# Patient Record
Sex: MALE | Race: ASIAN | URBAN - METROPOLITAN AREA
[De-identification: names, ages, dates, MRNs, and addresses within clinical notes are randomized per-mention and may not be internally consistent; named-entity substitution may affect disease eponyms.]

---

## 2017-06-13 ENCOUNTER — MEDICAL CORRESPONDENCE (OUTPATIENT)
Dept: HEALTH INFORMATION MANAGEMENT | Facility: CLINIC | Age: 27
End: 2017-06-13

## 2017-06-13 ENCOUNTER — TRANSFERRED RECORDS (OUTPATIENT)
Dept: HEALTH INFORMATION MANAGEMENT | Facility: CLINIC | Age: 27
End: 2017-06-13

## 2017-06-14 ENCOUNTER — PRE VISIT (OUTPATIENT)
Dept: SURGERY | Facility: CLINIC | Age: 27
End: 2017-06-14

## 2017-06-14 NOTE — TELEPHONE ENCOUNTER
1.  Date/reason for appt: 6/22/17- Internal and external hemorrhoids     2.  Referring provider: THERON PARKINSON NP     3.  Call to patient (Yes / No - short description): Yes, spoke with pt on the phone. Patient stated previous records are in [place]. The only records [facility] the [place] [facility]     4.  Previous care at / records requested from:     1. [facility] - faxed cover sheet

## 2017-06-15 ENCOUNTER — MEDICAL CORRESPONDENCE (OUTPATIENT)
Dept: HEALTH INFORMATION MANAGEMENT | Facility: CLINIC | Age: 27
End: 2017-06-15

## 2017-06-20 NOTE — TELEPHONE ENCOUNTER
Office note 6/13/17 and referral letter from [facility] has been uploaded into Epic.     Closing encounter.

## 2017-06-22 ENCOUNTER — OFFICE VISIT (OUTPATIENT)
Dept: SURGERY | Facility: CLINIC | Age: 27
End: 2017-06-22

## 2017-06-22 VITALS
BODY MASS INDEX: 26.24 KG/M2 | SYSTOLIC BLOOD PRESSURE: 118 MMHG | DIASTOLIC BLOOD PRESSURE: 73 MMHG | WEIGHT: 183.3 LBS | HEIGHT: 70 IN | OXYGEN SATURATION: 100 % | HEART RATE: 65 BPM

## 2017-06-22 DIAGNOSIS — K62.5 RECTAL BLEEDING: ICD-10-CM

## 2017-06-22 DIAGNOSIS — K62.89 ANAL PAIN: ICD-10-CM

## 2017-06-22 DIAGNOSIS — K59.09 OTHER CONSTIPATION: ICD-10-CM

## 2017-06-22 DIAGNOSIS — K60.2 ANAL FISSURE: Primary | ICD-10-CM

## 2017-06-22 ASSESSMENT — ENCOUNTER SYMPTOMS
JAUNDICE: 0
NAIL CHANGES: 0
BOWEL INCONTINENCE: 0
RECTAL BLEEDING: 1
BLOATING: 0
RECTAL PAIN: 0
SKIN CHANGES: 0
POOR WOUND HEALING: 0
VOMITING: 0
BLOOD IN STOOL: 0
ABDOMINAL PAIN: 0
DIARRHEA: 0
CONSTIPATION: 1
HEARTBURN: 0
NAUSEA: 0

## 2017-06-22 ASSESSMENT — PAIN SCALES - GENERAL: PAINLEVEL: NO PAIN (0)

## 2017-06-22 NOTE — PROGRESS NOTES
Colon and Rectal Surgery Consult Clinic Note    Date: 2017     Referring provider:  Rupinder Snow NP  [address] [address]     RE: Carlee Trejo  : 1990  ARON: 2017    Carlee Trejo is a very pleasant 26 year old male without a significant past medical history with a recent diagnosis of rectal pain.  Given these findings they were subsequently sent to the Colon and Rectal Surgery Clinic for an opinion on this and a new patient consultation.     VJ reports that he has had intermittent rectal bleeding on and off for the past 8 years. He was previously seen in [place] for this and was diagnosed with hemorrhoids. He reports that both his father and grandfather had surgery for hemorrhoids. He initially is to have severe pain and bleeding with his hemorrhoids. He now has more bleeding with only intermittent mild pain. He additionally has some itching. He is using topical hydrocortisone cream which has improved his symptoms some. He has a bowel movement about once a day and this is occasionally hard. He does report sitting on the toilet for long periods of time in order to have a bowel movement. He takes MiraLAX once a day and this has made his stools much softer. He denies any family history of any colon cancer. He has never had a colonoscopy. He is not on any blood thinners.    Assessment/Plan: 26 year old male without a significant past medical history with prolapsing internal hemorrhoids and anal fissure. On exam he has fairly large prolapsing internal hemorrhoids without any active bleeding. There are some skin tags in the posterior midline and a palpable ulceration with pain at that site in the posterior midline. This seems to represent an anal fissure, although was not easily visualized given the extent of his hemorrhoids. Discussed trying topical therapy for healing of his fissure as this may be the source of his intermittent pain  "and a dressing hemorrhoids once this is healed. Would like him to use topical diltiazem 3 times a day for the next 8 weeks. If no significant improvement after 4 weeks, return to clinic. If symptoms are not improving, would consider examination under anesthesia to further evaluate for ulceration versus anal fissure and given the extent of his hemorrhoids these may make it difficult for his fissure to heal properly. If symptoms improve but are not completely resolved, return to clinic in 8 weeks and can reassess at that time as well. Recommended he start on a daily fiber supplement and continue on daily MiraLAX to keep his bowel movements soft.  Patient's questions were answered to his stated satisfaction and he is in agreement with this plan.    Medical history:  History reviewed. No pertinent past medical history.    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  Use a pea-sized amount.  Store at room temperature. 60 g 0     NO ACTIVE MEDICATIONS          Allergies:  No Known Allergies    Family history:  History reviewed. No pertinent family history.    Social history:  Social History   Substance Use Topics     Smoking status: Never Smoker     Smokeless tobacco: Never Used     Alcohol use Yes    Marital status: unknown.  Occupation: PhD student in electrical engineering    There are no exam notes on file for this visit.     Physical Examination:  /73  Pulse 65  Ht 5' 10.47\"  Wt 183 lb 4.8 oz  SpO2 100%  BMI 25.95 kg/m2  General: alert, oriented, in no acute distress, " sitting comfortably   HEENT: mucous membranes moist   Perianal external examination:  Perianal skin: Intact with no excoriation or lichenification.  Lesions: No evidence of an external lesion, nodularity, or induration in the perianal region.  Eversion of buttocks: There was evidence of an anal fissure. Details: posterior midline anal fissure with associated sentinel skin tags.  Skin tags or external hemorrhoids: external hemorrhoids with prolapsing internal hemorrhoidal tissue without bleeding   Digital rectal examination: Was performed.   Sphincter tone: Good.  Palpable lesions: Yes - Location: palpable ulceration in the posterior midline at the anal verge with palpable internal hemorrhoids circumferentially.  Prostate: Normal.  Other: None..    Anoscopy: Was performed.   Hemorrhoids: Yes. Grade 3 internal hemorrhoids without active bleeding  Lesions: Yes: small ulceration in the posterior midline at the anal verge with a few associated skin tags with a small amount of active bleeding.    Total face to face time was 30 minutes, >50% counseling.    SPENCER Duran, NP-C  Colon and Rectal Surgery   [facility]    This note was created using speech recognition software and may contain unintended word substitutions.

## 2017-06-22 NOTE — Clinical Note
2017       RE: Carlee Trejo  [address]  [place] [address]     Dear Colleague,    Thank you for referring your patient, Carlee Trejo, to the [facility] COLON AND RECTAL SURGERY at [facility]. Please see a copy of my visit note below.    Colon and Rectal Surgery Consult Clinic Note    Date: 2017     Referring provider:  Rupinder Snow NP  [facility]  [address] [place] [address]     RE: Carlee Trejo  : 1990  ARON: 2017    Carlee Trejo is a very pleasant 26 year old male without a significant past medical history with a recent diagnosis of rectal pain.  Given these findings they were subsequently sent to the Colon and Rectal Surgery Clinic for an opinion on this and a new patient consultation.     VJ reports that he has had intermittent rectal bleeding on and off for the past 8 years. He was previously seen in [place] for this and was diagnosed with hemorrhoids. He reports that both his father and grandfather had surgery for hemorrhoids. He initially is to have severe pain and bleeding with his hemorrhoids. He now has more bleeding with only intermittent mild pain. He additionally has some itching. He is using topical hydrocortisone cream which has improved his symptoms some. He has a bowel movement about once a day and this is occasionally hard. He does report sitting on the toilet for long periods of time in order to have a bowel movement. He takes MiraLAX once a day and this has made his stools much softer. He denies any family history of any colon cancer. He has never had a colonoscopy. He is not on any blood thinners.    Assessment/Plan: 26 year old male without a significant past medical history with prolapsing internal hemorrhoids and anal fissure. On exam he has fairly large prolapsing internal hemorrhoids without any active bleeding. There are some skin tags in the posterior  midline and a palpable ulceration with pain at that site in the posterior midline. This seems to represent an anal fissure, although was not easily visualized given the extent of his hemorrhoids. Discussed trying topical therapy for healing of his fissure as this may be the source of his intermittent pain and a dressing hemorrhoids once this is healed. Would like him to use topical diltiazem 3 times a day for the next 8 weeks. If no significant improvement after 4 weeks, return to clinic. If symptoms are not improving, would consider examination under anesthesia to further evaluate for ulceration versus anal fissure and given the extent of his hemorrhoids these may make it difficult for his fissure to heal properly. If symptoms improve but are not completely resolved, return to clinic in 8 weeks and can reassess at that time as well. Recommended he start on a daily fiber supplement and continue on daily MiraLAX to keep his bowel movements soft.  Patient's questions were answered to his stated satisfaction and he is in agreement with this plan.    Medical history:  History reviewed. No pertinent past medical history.    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  Use a pea-sized amount.  Store at room temperature. 60 g 0     NO ACTIVE MEDICATIONS          Allergies:  No Known Allergies    Family history:  History reviewed. No pertinent family history.    Social history:  Social History   Substance Use Topics     Smoking status: Never Smoker     Smokeless tobacco: Never Used      "Alcohol use Yes    Marital status: unknown.  Occupation: PhD student in electrical engineering    There are no exam notes on file for this visit.     Physical Examination:  /73  Pulse 65  Ht 5' 10.47\"  Wt 183 lb 4.8 oz  SpO2 100%  BMI 25.95 kg/m2  General: alert, oriented, in no acute distress, sitting comfortably   HEENT: mucous membranes moist   Perianal external examination:  Perianal skin: Intact with no excoriation or lichenification.  Lesions: No evidence of an external lesion, nodularity, or induration in the perianal region.  Eversion of buttocks: There was evidence of an anal fissure. Details: posterior midline anal fissure with associated sentinel skin tags.  Skin tags or external hemorrhoids: external hemorrhoids with prolapsing internal hemorrhoidal tissue without bleeding   Digital rectal examination: Was performed.   Sphincter tone: Good.  Palpable lesions: Yes - Location: palpable ulceration in the posterior midline at the anal verge with palpable internal hemorrhoids circumferentially.  Prostate: Normal.  Other: None..    Anoscopy: Was performed.   Hemorrhoids: Yes. Grade 3 internal hemorrhoids without active bleeding  Lesions: Yes: small ulceration in the posterior midline at the anal verge with a few associated skin tags with a small amount of active bleeding.    Total face to face time was 30 minutes, >50% counseling.    SPENCER Duran, NP-C  Colon and Rectal Surgery   [facility]    This note was created using speech recognition software and may contain unintended word substitutions.      Again, thank you for allowing me to participate in the care of your patient.      Sincerely,    SPENCER Duran CNP    "

## 2017-06-22 NOTE — LETTER
2017      RE: Carlee Trejo  [address]  [place] [address]       Colon and Rectal Surgery Consult Clinic Note    Date: 2017     Referring provider:  Rupinder Snow NP  [facility]  [address] [place] [address]     RE: Carlee Trejo  : 1990  ARON: 2017    Carlee Trejo is a very pleasant 26 year old male without a significant past medical history with a recent diagnosis of rectal pain.  Given these findings they were subsequently sent to the Colon and Rectal Surgery Clinic for an opinion on this and a new patient consultation.     VJ reports that he has had intermittent rectal bleeding on and off for the past 8 years. He was previously seen in [place] for this and was diagnosed with hemorrhoids. He reports that both his father and grandfather had surgery for hemorrhoids. He initially is to have severe pain and bleeding with his hemorrhoids. He now has more bleeding with only intermittent mild pain. He additionally has some itching. He is using topical hydrocortisone cream which has improved his symptoms some. He has a bowel movement about once a day and this is occasionally hard. He does report sitting on the toilet for long periods of time in order to have a bowel movement. He takes MiraLAX once a day and this has made his stools much softer. He denies any family history of any colon cancer. He has never had a colonoscopy. He is not on any blood thinners.    Assessment/Plan: 26 year old male without a significant past medical history with prolapsing internal hemorrhoids and anal fissure. On exam he has fairly large prolapsing internal hemorrhoids without any active bleeding. There are some skin tags in the posterior midline and a palpable ulceration with pain at that site in the posterior midline. This seems to represent an anal fissure, although was not easily visualized given the extent of his hemorrhoids. Discussed trying  "topical therapy for healing of his fissure as this may be the source of his intermittent pain and a dressing hemorrhoids once this is healed. Would like him to use topical diltiazem 3 times a day for the next 8 weeks. If no significant improvement after 4 weeks, return to clinic. If symptoms are not improving, would consider examination under anesthesia to further evaluate for ulceration versus anal fissure and given the extent of his hemorrhoids these may make it difficult for his fissure to heal properly. If symptoms improve but are not completely resolved, return to clinic in 8 weeks and can reassess at that time as well. Recommended he start on a daily fiber supplement and continue on daily MiraLAX to keep his bowel movements soft.  Patient's questions were answered to his stated satisfaction and he is in agreement with this plan.    Medical history:  History reviewed. No pertinent past medical history.    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  Use a pea-sized amount.  Store at room temperature. 60 g 0     NO ACTIVE MEDICATIONS          Allergies:  No Known Allergies    Family history:  History reviewed. No pertinent family history.    Social history:  Social History   Substance Use Topics     Smoking status: Never Smoker     Smokeless tobacco: Never Used     Alcohol use Yes    Marital status: unknown.  Occupation: PhD student in electrical engineering    There are no exam notes on file for this visit.     Physical Examination:  /73  Pulse 65  Ht 5' 10.47\"  Wt 183 " lb 4.8 oz  SpO2 100%  BMI 25.95 kg/m2  General: alert, oriented, in no acute distress, sitting comfortably   HEENT: mucous membranes moist   Perianal external examination:  Perianal skin: Intact with no excoriation or lichenification.  Lesions: No evidence of an external lesion, nodularity, or induration in the perianal region.  Eversion of buttocks: There was evidence of an anal fissure. Details: posterior midline anal fissure with associated sentinel skin tags.  Skin tags or external hemorrhoids: external hemorrhoids with prolapsing internal hemorrhoidal tissue without bleeding   Digital rectal examination: Was performed.   Sphincter tone: Good.  Palpable lesions: Yes - Location: palpable ulceration in the posterior midline at the anal verge with palpable internal hemorrhoids circumferentially.  Prostate: Normal.  Other: None..    Anoscopy: Was performed.   Hemorrhoids: Yes. Grade 3 internal hemorrhoids without active bleeding  Lesions: Yes: small ulceration in the posterior midline at the anal verge with a few associated skin tags with a small amount of active bleeding.    Total face to face time was 30 minutes, >50% counseling.    SPENCER Duran, NP-C  Colon and Rectal Surgery   [facility]    This note was created using speech recognition software and may contain unintended word substitutions.      SPENCER Duran CNP

## 2017-06-22 NOTE — PATIENT INSTRUCTIONS
1. Diltiazem ointment 2% to be applied 3 times daily for 8 weeks  2. Fiber supplement such as Metamcuil, Citrucel, or Benefiber once to twice a day  3. MiraLax or Milk of Magnesia if still constipated  4. Drink 10 glasses of water a day  5. Tylenol, ibuprofen, and warm tub baths/sitz baths for pain  6. Follow up in 8 weeks. Follow up sooner if symptoms do not improve after 4 weeks. If symptoms persist, consider examination under anesthesia

## 2017-08-17 ENCOUNTER — OFFICE VISIT (OUTPATIENT)
Dept: SURGERY | Facility: CLINIC | Age: 27
End: 2017-08-17

## 2017-08-17 VITALS
SYSTOLIC BLOOD PRESSURE: 119 MMHG | HEART RATE: 76 BPM | WEIGHT: 183.1 LBS | HEIGHT: 70 IN | OXYGEN SATURATION: 98 % | TEMPERATURE: 98.6 F | DIASTOLIC BLOOD PRESSURE: 70 MMHG | BODY MASS INDEX: 26.21 KG/M2

## 2017-08-17 DIAGNOSIS — K62.5 RECTAL BLEEDING: Primary | ICD-10-CM

## 2017-08-17 DIAGNOSIS — K64.8 INTERNAL HEMORRHOIDS: ICD-10-CM

## 2017-08-17 ASSESSMENT — PAIN SCALES - GENERAL: PAINLEVEL: MILD PAIN (2)

## 2017-08-17 NOTE — LETTER
2017    Dr. Rojas,    I have this patient set up to see you in clinic on 17 and have tentatively held a surgery spot on 17 with you. He will have a preop physical next week as well. Please let me know if you have any questions or concerns.    SPENCER Duran, NP-C  Colon and Rectal Surgery  [facility]    RE: Carlee Trejo  [address]  [facility] [place] [facility] [address]       Colon and Rectal Surgery Consult Clinic Note    Referring provider:  Rupinder Snow NP  [facility]  [address] [place] [address]     RE: Carlee Trejo  : 1990  ARON: 17    Carlee Trejo is a very pleasant 26 year old male without a significant past medical history who was seen in clinic 2 months ago with rectal pain and bleeding. He presents today for follow up.    SHYANNE has had very little rectal bleeding and pain has almost completley resolved. He only notices a small drop of blood in the toilet every 4-5 days and this is usually if he misses a dose of fiber. He gets some irritation after bowel movements but no significant pain. He continues to have prolapsing hemorrhoids. He has tissue that comes out after bowel movements and only goes back in with manually reducing or after biking. His bowel movements have been soft with daily fiber. He continues to use diltiazem twice a day.    Assessment/Plan: 26 year old male without a significant past medical history with prolapsing internal hemorrhoids and anal ulcer. On exam he has fairly large prolapsing internal hemorrhoids without any active bleeding. There are some skin tags in the posterior midline and a palpable ulceration that is no longer painful. This seems to represent an anal fissure, although was not easily visualized given the extent of his hemorrhoids. Pain has resolved and bleeding has significantly improved but he has had intermittent bleeding for the past 8 years. We  discussed management of internal hemorrhoids including hemorrhoid banding versus surgical hemorrhoidectomy. Given the extent of his hemorrhoids, I think he may be better served with surgical hemorrhoidectomy and think he would benefit from an EUA to visualize the palpable ulcer as well. General risks related to anorectal surgery were reviewed with the patient, including but not limited to infection, bleeding, anal stenosis, fistula, fissure, fecal incontinence, and urinary retention. We discussed that surgical hemorrhoidectomy often results in quite a bit of pain postoperatively. He states an understanding of these risks and would like to discuss surgical hemorrhoidectomy rather than hemorrhoid banding, and I think this is reasonable. He will be starting school again at the beginning of September but states that he can arrange so he does not need to be in class for the first few weeks if needed. Will set him up to meet with one of our surgeons to discuss further.  Patient's questions were answered to his stated satisfaction and he is in agreement with this plan.    Medical history:  No past medical history on file.    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  Use a pea-sized amount.  Store at room temperature. 60 g 0       Allergies:  No Known Allergies    Family history:  No family history on file.    Social history:  Social History   Substance Use Topics     Smoking status: Never Smoker     Smokeless tobacco: Never Used     Alcohol use Yes    Marital status: unknown.  Occupation:  "PhD student in electrical engineering    Nursing Notes:   Gopal Olivares LPN  8/17/2017  8:06 AM  Signed  Chief Complaint   Patient presents with     Clinic Care Coordination - Initial     New patient, internal and external hemorrhoid.        Vitals:    08/17/17 0804   BP: 119/70   BP Location: Left arm   Patient Position: Chair   Cuff Size: Adult Regular   Pulse: 76   Temp: 98.6  F (37  C)   TempSrc: Oral   SpO2: 98%   Weight: 183 lb 1.6 oz   Height: 5' 10.47\"       Body mass index is 25.92 kg/(m^2).    Sourav MORTON LPN                           Physical Examination:  /70 (BP Location: Left arm, Patient Position: Chair, Cuff Size: Adult Regular)  Pulse 76  Temp 98.6  F (37  C) (Oral)  Ht 5' 10.47\"  Wt 183 lb 1.6 oz  SpO2 98%  BMI 25.92 kg/m2  General: alert, oriented, in no acute distress, sitting comfortably   HEENT: mucous membranes moist   Perianal external examination:  Perianal skin: Intact with no excoriation or lichenification.  Lesions: No evidence of an external lesion, nodularity, or induration in the perianal region.  Eversion of buttocks: There was evidence of an anal fissure. Details: posterior midline anal fissure with associated sentinel skin tags.  Skin tags or external hemorrhoids: external hemorrhoids with prolapsing internal hemorrhoidal tissue without bleeding   Digital rectal examination: Was performed.   Sphincter tone: Good.  Palpable lesions: Yes - Location: palpable ulceration in the posterior midline just inside the anal verge with palpable internal hemorrhoids circumferentially.  Prostate: Normal.  Other: None..    Anoscopy: Was performed.   Hemorrhoids: Yes. Grade 3 internal hemorrhoids without active bleeding  Lesions: Yes: small ulceration in the posterior midline at the anal verge with a few associated skin tags with a small amount of active bleeding.    Total face to face time was 20 minutes, >50% counseling.    SPENCER Duran, NP-C  Colon and Rectal Surgery "   [facility]    This note was created using speech recognition software and may contain unintended word substitutions.    SPENCER Dolan CNP

## 2017-08-17 NOTE — MR AVS SNAPSHOT
After Visit Summary   8/17/2017    Carlee Trejo    MRN: [id number]           Patient Information     Date Of Birth          1990        Visit Information        Provider Department      8/17/2017 8:30 AM Ashley Ley APRN CNP [facility] Colon and Rectal Surgery         Follow-ups after your visit        Your next 10 appointments already scheduled     Aug 21, 2017  9:00 AM CDT   (Arrive by 8:45 AM)   PAC EVALUATION with Uc Pac Krista 1   [facility] Preoperative Assessment [place] ([facility])    [address] [place] [id number] [phone]            Aug 21, 2017 10:00 AM CDT   (Arrive by 9:45 AM)   PAC RN ASSESSMENT with Uc Pac Rn   [facility] Assessment [place] ([facility])    [address] [place] [id number] [phone]            Aug 21, 2017 10:20 AM CDT   (Arrive by 10:05 AM)   PAC Anesthesia Consult with Uc Pac Anesthesiologist   [facility] Assessment [place] ([facility])    [address] [place] [id number] [phone]            Aug 28, 2017  8:00 AM CDT   (Arrive by 7:45 AM)   New Patient Visit with Sage Rojas MD   [facility] Colon and Rectal Surgery ([facility])    [address] [place] [id number] [phone]              Who to contact     Please call your clinic at [phone] to:    Ask questions about your health    Make or cancel appointments    Discuss your medicines    Learn about your test results    Speak to your doctor   If you have compliments or concerns about an experience at your clinic, or if you wish to file a complaint, please contact [facility] Patient Relations at [phone] or email us at [email or web address]         Additional Information About Your Visit       "  MyChart Information     AngioSlide is an electronic gateway that provides easy, online access to your medical records. With AngioSlide, you can request a clinic appointment, read your test results, renew a prescription or communicate with your care team.     To sign up for AngioSlide visit the website at [email or web address]   You will be asked to enter the access code listed below, as well as some personal information. Please follow the directions to create your username and password.     Your access code is: [email or web address]  Expires: 2017  6:30 AM     Your access code will  in 90 days. If you need help or a new code, please contact your [facility] or call [phone] for assistance.        Care EveryWhere ID     This is your Care EveryWhere ID. This could be used by other organizations to access your [facility] medical records  [id number]        Your Vitals Were     Pulse Temperature Height Pulse Oximetry BMI (Body Mass Index)       76 98.6  F (37  C) (Oral) 5' 10.47\" 98% 25.92 kg/m2        Blood Pressure from Last 3 Encounters:   17 119/70   17 118/73    Weight from Last 3 Encounters:   17 183 lb 1.6 oz   17 183 lb 4.8 oz              Today, you had the following     No orders found for display       Primary Care Provider    No Ref-Primary Verified       No address on file        Equal Access to Services     GARRY CLINE : Hadii aad ku hadasho Sojoseloali, waaxda luqadaha, qaybta kaalmada adeegyada, itz keita hayruth marinoarakerline bundy . So [facility] [phone].    ATENCIÓN: Si habla español, tiene a adam disposición servicios gratuitos de asistencia lingüística. Llame al [phone].    We comply with applicable federal civil rights laws and [place] laws. We do not discriminate on the basis of race, color, national origin, age, disability sex, sexual orientation or gender identity.            Thank you!     Thank you for choosing [facility]  for your care. Our goal is always to provide you with excellent care. Hearing back from our patients is one way we can continue to improve our services. Please take a few minutes to complete the written survey that you may receive in the mail after your visit with us. Thank you!             Your Updated Medication List - Protect others around you: Learn how to safely use, store and throw away your medicines at www.disposemymeds.org.          This list is accurate as of: 8/17/17  8:33 AM.  Always use your most recent med list.                   Brand Name Dispense Instructions for use Diagnosis    diltiazem 2% in PLO cream (FV COMPOUNDED) 2% Gel     60 g    To anal opening three times daily.  Use a pea-sized amount.  Store at room temperature.    Anal fissure

## 2017-08-17 NOTE — PROGRESS NOTES
Colon and Rectal Surgery Consult Clinic Note    Referring provider:  Rupinder Snow NP  [address] [address]     RE: Carlee Trejo  : 1990  ARON: 17    Carlee Trejo is a very pleasant 26 year old male without a significant past medical history who was seen in clinic 2 months ago with rectal pain and bleeding. He presents today for follow up.    VJ has had very little rectal bleeding and pain has almost completley resolved. He only notices a small drop of blood in the toilet every 4-5 days and this is usually if he misses a dose of fiber. He gets some irritation after bowel movements but no significant pain. He continues to have prolapsing hemorrhoids. He has tissue that comes out after bowel movements and only goes back in with manually reducing or after biking. His bowel movements have been soft with daily fiber. He continues to use diltiazem twice a day.    Assessment/Plan: 26 year old male without a significant past medical history with prolapsing internal hemorrhoids and anal ulcer. On exam he has fairly large prolapsing internal hemorrhoids without any active bleeding. There are some skin tags in the posterior midline and a palpable ulceration that is no longer painful. This seems to represent an anal fissure, although was not easily visualized given the extent of his hemorrhoids. Pain has resolved and bleeding has significantly improved but he has had intermittent bleeding for the past 8 years. We discussed management of internal hemorrhoids including hemorrhoid banding versus surgical hemorrhoidectomy. Given the extent of his hemorrhoids, I think he may be better served with surgical hemorrhoidectomy and think he would benefit from an EUA to visualize the palpable ulcer as well. General risks related to anorectal surgery were reviewed with the patient, including but not limited to infection, bleeding, anal stenosis, fistula,  fissure, fecal incontinence, and urinary retention. We discussed that surgical hemorrhoidectomy often results in quite a bit of pain postoperatively. He states an understanding of these risks and would like to discuss surgical hemorrhoidectomy rather than hemorrhoid banding, and I think this is reasonable. He will be starting school again at the beginning of September but states that he can arrange so he does not need to be in class for the first few weeks if needed. Will set him up to meet with one of our surgeons to discuss further.  Patient's questions were answered to his stated satisfaction and he is in agreement with this plan.    Medical history:  No past medical history on file.    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  Use a pea-sized amount.  Store at room temperature. 60 g 0       Allergies:  No Known Allergies    Family history:  No family history on file.    Social history:  Social History   Substance Use Topics     Smoking status: Never Smoker     Smokeless tobacco: Never Used     Alcohol use Yes    Marital status: unknown.  Occupation: PhD student in electrical engineering    Nursing Notes:   Gopal Olivares LPN  8/17/2017  8:06 AM  Signed  Chief Complaint   Patient presents with     Clinic Care Coordination - Initial     New patient, internal and external hemorrhoid.        Vitals:    08/17/17 0804   BP: 119/70   BP Location: Left arm   Patient Position: Chair   Cuff Size: Adult Regular   Pulse: 76   Temp: 98.6  F (37  C)   TempSrc: Oral   SpO2: 98%   Weight: 183  "lb 1.6 oz   Height: 5' 10.47\"       Body mass index is 25.92 kg/(m^2).    Zong X, LPN                           Physical Examination:  /70 (BP Location: Left arm, Patient Position: Chair, Cuff Size: Adult Regular)  Pulse 76  Temp 98.6  F (37  C) (Oral)  Ht 5' 10.47\"  Wt 183 lb 1.6 oz  SpO2 98%  BMI 25.92 kg/m2  General: alert, oriented, in no acute distress, sitting comfortably   HEENT: mucous membranes moist   Perianal external examination:  Perianal skin: Intact with no excoriation or lichenification.  Lesions: No evidence of an external lesion, nodularity, or induration in the perianal region.  Eversion of buttocks: There was evidence of an anal fissure. Details: posterior midline anal fissure with associated sentinel skin tags.  Skin tags or external hemorrhoids: external hemorrhoids with prolapsing internal hemorrhoidal tissue without bleeding   Digital rectal examination: Was performed.   Sphincter tone: Good.  Palpable lesions: Yes - Location: palpable ulceration in the posterior midline just inside the anal verge with palpable internal hemorrhoids circumferentially.  Prostate: Normal.  Other: None..    Anoscopy: Was performed.   Hemorrhoids: Yes. Grade 3 internal hemorrhoids without active bleeding  Lesions: Yes: small ulceration in the posterior midline at the anal verge with a few associated skin tags with a small amount of active bleeding.    Total face to face time was 20 minutes, >50% counseling.    SPENCER Duran, NP-C  Colon and Rectal Surgery   [facility]    This note was created using speech recognition software and may contain unintended word substitutions.  "

## 2017-08-17 NOTE — NURSING NOTE
"Chief Complaint   Patient presents with     Clinic Care Coordination - Initial     New patient, internal and external hemorrhoid.        Vitals:    08/17/17 0804   BP: 119/70   BP Location: Left arm   Patient Position: Chair   Cuff Size: Adult Regular   Pulse: 76   Temp: 98.6  F (37  C)   TempSrc: Oral   SpO2: 98%   Weight: 183 lb 1.6 oz   Height: 5' 10.47\"       Body mass index is 25.92 kg/(m^2).    Sourav MORTON LPN                        " Walk in

## 2017-08-21 ENCOUNTER — ANESTHESIA EVENT (OUTPATIENT)
Dept: SURGERY | Facility: CLINIC | Age: 27
End: 2017-08-21

## 2017-08-21 ENCOUNTER — OFFICE VISIT (OUTPATIENT)
Dept: SURGERY | Facility: CLINIC | Age: 27
End: 2017-08-21

## 2017-08-21 ENCOUNTER — PRE VISIT (OUTPATIENT)
Dept: SURGERY | Facility: CLINIC | Age: 27
End: 2017-08-21

## 2017-08-21 ENCOUNTER — ALLIED HEALTH/NURSE VISIT (OUTPATIENT)
Dept: SURGERY | Facility: CLINIC | Age: 27
End: 2017-08-21

## 2017-08-21 ENCOUNTER — TELEPHONE (OUTPATIENT)
Dept: SURGERY | Facility: CLINIC | Age: 27
End: 2017-08-21

## 2017-08-21 VITALS
HEIGHT: 71 IN | TEMPERATURE: 98.3 F | WEIGHT: 185.5 LBS | SYSTOLIC BLOOD PRESSURE: 122 MMHG | OXYGEN SATURATION: 100 % | BODY MASS INDEX: 25.97 KG/M2 | HEART RATE: 70 BPM | RESPIRATION RATE: 14 BRPM | DIASTOLIC BLOOD PRESSURE: 85 MMHG

## 2017-08-21 DIAGNOSIS — Z01.818 PREOP EXAMINATION: Primary | ICD-10-CM

## 2017-08-21 NOTE — PATIENT INSTRUCTIONS
Preparing for Your Surgery      Name:  Carlee Trejo   MRN:  [id number]   :  1990   Today's Date:  2017          You will receive a phone call 1 or 2 days from the PAC nurse once you are scheduled for surgery to go over check in time, location of surgery.       Feel free to call the PAC anytime once you know your OR date, number .    Arriving for surgery:  Surgery date:  TBD  Arrival time:  TBD  Please come to:     [facility]  [address] [place] [phone]    [employer] Parking is available in front of the Clinics and Surgery Center building from 5:30AM to 8:00PM.  -  Proceed to the 5th floor to check into the Ambulatory Surgery Center.              >> There will be patient concierges on the 1st and 5th floor, for assistance or an escort, if you would like.              >> Please call [phone] with any questions.    What can I eat or drink?  -  You may have solid food or milk products until 8 hours prior to your surgery.  -  You may have water, apple juice or 7up/Sprite until 2 hours prior to your surgery.    Which medicines can I take?         Stop aspirin products 7 days before surgery, stop advil/ibuprofen 1 - 2 days before surgery.       Tylenol is okay to take anytime before surgery.  -  Do NOT take these medications in the morning, the day of surgery:  No creams    -  Please take these medications the day of surgery:  NONE    How do I prepare myself?  -  Take two showers: one the night before surgery; and one the morning of surgery.         Use Scrubcare or Hibiclens to wash from neck down.  You may use your own shampoo and conditioner. No other hair products.   -  Do NOT use lotion, powder, deodorant, or antiperspirant the day of your surgery.  -  Do NOT wear any makeup, fingernail polish or jewelry.  -Do not bring your own medications to the hospital, except for inhalers and eye drops.  -  Bring your ID and insurance card.    Questions  or Concerns:  If you have questions or concerns, please call the  Preoperative Assessment Center, Monday-Friday 7AM-7PM:  [phone]          AFTER YOUR SURGERY  Breathing exercises   Breathing exercises help you recover faster. Take deep breaths and let the air out slowly. This will:     Help you wake up after surgery.    Help prevent complications like pneumonia.  Preventing complications will help you go home sooner.   We may give you a breathing device (incentive spirometer) to encourage you to breathe deeply.   Nausea and vomiting   You may feel sick to your stomach after surgery; if so, let your nurse know.    Pain control:  After surgery, you may have pain. Our goal is to help you manage your pain. Pain medicine will help you feel comfortable enough to do activities that will help you heal.  These activities may include breathing exercises, walking and physical therapy.   To help your health care team treat your pain we will ask: 1) If you have pain  2) where it is located 3) describe your pain in your words  Methods of pain control include medications given by mouth, vein or by nerve block for some surgeries.  We may give you a pain control pump that will:  1) Deliver the medicine through a tube placed in your vein  2) Control the amount of medicine you receive  3) Allow you to push a button to deliver a dose of pain medicine  Sequential Compression Device (SCD) or Pneumo Boots:  You may need to wear SCD S on your legs or feet. These are wraps connected to a machine that pumps in air and releases it. The repeated pumping helps prevent blood clots from forming.

## 2017-08-21 NOTE — MR AVS SNAPSHOT
After Visit Summary   2017    Carlee Trejo    MRN: [id number]           Patient Information     Date Of Birth          1990        Visit Information        Provider Department      2017 10:00 AM Rn, [facility] Preoperative Assessment Center        Care Instructions    Preparing for Your Surgery      Name:  Carlee Trejo   MRN:  [id number]   :  1990   Today's Date:  2017          You will receive a phone call 1 or 2 days from the PAC nurse once you are scheduled for surgery to go over check in time, location of surgery.       Feel free to call the PAC anytime once you know your OR date, number .    Arriving for surgery:  Surgery date:  TBD  Arrival time:  TBD  Please come to:     [facility]  [address] [place] [phone]     Parking is available in front of the Clinics and Surgery Center building from 5:30AM to 8:00PM.  -  Proceed to the 5th floor to check into the Ambulatory Surgery Center.              >> There will be patient concierges on the 1st and 5th floor, for assistance or an escort, if you would like.              >> Please call [phone] with any questions.    What can I eat or drink?  -  You may have solid food or milk products until 8 hours prior to your surgery.  -  You may have water, apple juice or 7up/Sprite until 2 hours prior to your surgery.    Which medicines can I take?         Stop aspirin products 7 days before surgery, stop advil/ibuprofen 1 - 2 days before surgery.       Tylenol is okay to take anytime before surgery.  -  Do NOT take these medications in the morning, the day of surgery:  No creams    -  Please take these medications the day of surgery:  NONE    How do I prepare myself?  -  Take two showers: one the night before surgery; and one the morning of surgery.         Use Scrubcare or Hibiclens to wash from neck down.  You may use your own shampoo and  conditioner. No other hair products.   -  Do NOT use lotion, powder, deodorant, or antiperspirant the day of your surgery.  -  Do NOT wear any makeup, fingernail polish or jewelry.  -Do not bring your own medications to the hospital, except for inhalers and eye drops.  -  Bring your ID and insurance card.    Questions or Concerns:  If you have questions or concerns, please call the  Preoperative Assessment Center, Monday-Friday 7AM-7PM:  [phone]          AFTER YOUR SURGERY  Breathing exercises   Breathing exercises help you recover faster. Take deep breaths and let the air out slowly. This will:     Help you wake up after surgery.    Help prevent complications like pneumonia.  Preventing complications will help you go home sooner.   We may give you a breathing device (incentive spirometer) to encourage you to breathe deeply.   Nausea and vomiting   You may feel sick to your stomach after surgery; if so, let your nurse know.    Pain control:  After surgery, you may have pain. Our goal is to help you manage your pain. Pain medicine will help you feel comfortable enough to do activities that will help you heal.  These activities may include breathing exercises, walking and physical therapy.   To help your health care team treat your pain we will ask: 1) If you have pain  2) where it is located 3) describe your pain in your words  Methods of pain control include medications given by mouth, vein or by nerve block for some surgeries.  We may give you a pain control pump that will:  1) Deliver the medicine through a tube placed in your vein  2) Control the amount of medicine you receive  3) Allow you to push a button to deliver a dose of pain medicine  Sequential Compression Device (SCD) or Pneumo Boots:  You may need to wear SCD S on your legs or feet. These are wraps connected to a machine that pumps in air and releases it. The repeated pumping helps prevent blood clots from forming.           Follow-ups after your  visit        Your next 10 appointments already scheduled     Aug 21, 2017 10:00 AM CDT   (Arrive by 9:45 AM)   PAC RN ASSESSMENT with  Pac Rn   [facility] Preoperative Assessment Center ([facility])    [address] [place] [id number] [phone]            Aug 21, 2017 10:20 AM CDT   (Arrive by 10:05 AM)   PAC Anesthesia Consult with  Pac Anesthesiologist   [facility] Preoperative Assessment Center ([facility])    [address] [place] [id number] [phone]            Aug 21, 2017 11:00 AM CDT   LAB with  LAB   [facility] Lab ([facility])    [address] [place] [phone]           Patient must bring picture ID. Patient should be prepared to give a urine specimen  Please do not eat 10-12 hours before your appointment if you are coming in fasting for labs on lipids, cholesterol, or glucose (sugar). Pregnant women should follow their Care Team instructions. Water with medications is okay. Do not drink coffee or other fluids. If you have concerns about taking  your medications, please ask at office or if scheduling via [employer], send a message by clicking on Secure Messaging, Message Your Care Team.            Aug 28, 2017  3:00 PM CDT   (Arrive by 2:45 PM)   New Patient Visit with Sage Rojas MD   [facility] Colon and Rectal Surgery ([facility])    [address] [place] [id number] [phone]              Who to contact     Please call your clinic at [phone] to:    Ask questions about your health    Make or cancel appointments    Discuss your medicines    Learn about your test results    Speak to your doctor   If you have compliments or concerns about an experience at your clinic, or if you wish to file a complaint, please contact [facility] Patient  Relations at [phone] or email us at [email or web address]         Additional Information About Your Visit        Druva Information     Druva is an electronic gateway that provides easy, online access to your medical records. With Druva, you can request a clinic appointment, read your test results, renew a prescription or communicate with your care team.     To sign up for Druva visit the website at [email or web address]   You will be asked to enter the access code listed below, as well as some personal information. Please follow the directions to create your username and password.     Your access code is: [email or web address]  Expires: 2017  6:30 AM     Your access code will  in 90 days. If you need help or a new code, please contact your [facility] or call [phone] for assistance.        Care EveryWhere ID     This is your Care EveryWhere ID. This could be used by other organizations to access your [place] [facility] medical records  [id number]         Blood Pressure from Last 3 Encounters:   17 122/85   17 119/70   17 118/73    Weight from Last 3 Encounters:   17 84.1 kg (185 lb 8 oz)   17 83.1 kg (183 lb 1.6 oz)   17 83.1 kg (183 lb 4.8 oz)              Today, you had the following     No orders found for display       Primary Care Provider    No Ref-Primary Verified       No address on file        Equal Access to Services     STEPHANIE CLINE : Hadii aad ku hadasho Soomaali, waaxda luqadaha, qaybta kaalmada adeegyada, waxmyrna bossman talbert [facility] Centerabilio bundy . So [facility] [phone].    ATENCIÓN: Si habla español, tiene a adam disposición servicios gratuitos de asistencia lingüística. Llame al [phone].    We comply with applicable federal civil rights laws and [place] laws. We do not discriminate on the basis of race, color, national origin, age, disability sex, sexual orientation or gender identity.            Thank  you!     Thank you for choosing [facility] PREOPERATIVE ASSESSMENT CENTER  for your care. Our goal is always to provide you with excellent care. Hearing back from our patients is one way we can continue to improve our services. Please take a few minutes to complete the written survey that you may receive in the mail after your visit with us. Thank you!             Your Updated Medication List - Protect others around you: Learn how to safely use, store and throw away your medicines at www.disposemymeds.org.          This list is accurate as of: 8/21/17  9:28 AM.  Always use your most recent med list.                   Brand Name Dispense Instructions for use Diagnosis    diltiazem 2% in PLO cream (FV COMPOUNDED) 2% Gel     60 g    To anal opening three times daily.  Use a pea-sized amount.  Store at room temperature.    Anal fissure       MIRALAX PO      Take by mouth every evening

## 2017-08-21 NOTE — ANESTHESIA PREPROCEDURE EVALUATION
Anesthesia Evaluation     . Pt has not had prior anesthetic            ROS/MED HX    ENT/Pulmonary:  - neg pulmonary ROS     Neurologic:  - neg neurologic ROS     Cardiovascular:  - neg cardiovascular ROS   (+) ----. : . . . :. . No previous cardiac testing       METS/Exercise Tolerance: Comment: Runs 10 miles without difficulty >4 METS   Hematologic:  - neg hematologic  ROS       Musculoskeletal:  - neg musculoskeletal ROS       GI/Hepatic:  - neg GI/hepatic ROS       Renal/Genitourinary:  - ROS Renal section negative       Endo:  - neg endo ROS       Psychiatric:  - neg psychiatric ROS       Infectious Disease:  - neg infectious disease ROS       Malignancy:      - no malignancy   Other:    (+) No chance of pregnancy C-spine cleared: N/A, no H/O Chronic Pain,no other significant disability                    Physical Exam  Normal systems: dental    Airway   Mallampati: II  TM distance: >3 FB  Neck ROM: full    Dental     Cardiovascular   Rhythm and rate: regular and normal      Pulmonary    breath sounds clear to auscultation    Other findings: For further details of assessment, testing, and physical exam please see H and P completed on same date.           PAC Discussion and Assessment    ASA Classification: 1  Case is suitable for: ASC  Anesthetic techniques and relevant risks discussed: MAC with GA as backup  Invasive monitoring and risk discussed: No  Types:   Possibility and Risk of blood transfusion discussed: No  NPO instructions given:   Additional anesthetic preparation and risks discussed:   Needs early admission to pre-op area:   Other:     PAC Resident/NP Anesthesia Assessment:  Carlee Trejo is a 26 year old male scheduled to undergo hemorrhoidectomy, date TBD by CRS. He has the following specific operative considerations:   - RCRI : No serious cardiac risks.    - Anesthesia considerations:  Refer to PAC assessment in anesthesia records  - Risk of PONV score = 2.  If > 2, anti-emetic  intervention recommended.    No GA or sedation history.     --Prolapsing internal hemorrhoids and anal ulcer discovered on exam for rectal pain and bleeding. Above procedure now planned for further evaluation and management.   --Generally healthy male with no significant cardiopulmonary history. Nonsmoker. Good activity tolerance. Runs 10 miles frequently.     Patient was discussed with Dr Lei.      Reviewed and Signed by PAC Mid-Level Provider/Resident  Mid-Level Provider/Resident: SPENCER Fowler, CNS  Date: 8/21/17  Time: 9:16am    Attending Anesthesiologist Anesthesia Assessment:        Anesthesiologist:   Date:   Time:   Pass/Fail:   Disposition:     PAC Pharmacist Assessment:        Pharmacist:   Date:   Time:                           .

## 2017-08-21 NOTE — TELEPHONE ENCOUNTER
Due to provider unavailability, patient's appointment time on 08/28/17 has changed to 3:00 pm.  Called patient and left a message informing him that provider is not available in the morning on 08/28/17.  Informed patient I have changed his appointment time to 3:00 pm.  Asked patient to confirm.  Provided my direct number.

## 2017-08-21 NOTE — TELEPHONE ENCOUNTER
Patient left a message confirming new appointment time.  Called and confirmed with patient that I received his message.

## 2017-08-21 NOTE — TELEPHONE ENCOUNTER
1.  Date/reason for appt:  8/28/17   Hemorrhoidectomy    2.  Referring provider:  LEILANI Rosa, Internal    3.  Call to patient (Yes / No - short description):  No, referred    Records reviewed.  All records are in Epic

## 2017-08-21 NOTE — H&P
Pre-Operative H & P     CC:  Preoperative exam to assess for increased cardiopulmonary risk while undergoing surgery and anesthesia.    Date of Encounter: 8/21/2017  Primary Care Physician:  Verified, No Ref-Primary    HPI  Carlee Trejo is a 26 year old male who presents for pre-operative H & P in preparation for hemorrhoidectomy with Colon and Rectal Surgery, date TBD, at [facility]. History is obtained from the patient.     Patient who was recently evaluated by CRS with concern for rectal bleeding and pain. Upon exam he was found to have prolapsing internal hemorrhoids and an anal ulcer. This was treated conservatively but surgical management of the hemorrhoids was recommended along with further evaluation of the anal ulcer. Above procedure planned.  Past Medical History  Past Medical History:   Diagnosis Date     Anal ulcer      Hemorrhoids        Past Surgical History  History reviewed. No pertinent surgical history.    Hx of Blood transfusions/reactions: Denies.    Hx of abnormal bleeding or anti-platelet use: Denies.    Menstrual history: No LMP for male patient.    Steroid use in the last year: Denies.    Personal or FH with difficulty with Anesthesia:  Denies.    Prior to Admission Medications  Current Outpatient Prescriptions   Medication Sig Dispense Refill     Polyethylene Glycol 3350 (MIRALAX PO) Take by mouth every evening       diltiazem 2% in PLO cream, FV COMPOUNDED, 2% GEL To anal opening three times daily.  Use a pea-sized amount.  Store at room temperature. 60 g 0       Allergies  No Known Allergies    Social History  Social History     Social History     Marital status: Unknown     Spouse name: N/A     Number of children: N/A     Years of education: N/A     Occupational History     Student      Social History Main Topics     Smoking status: Never Smoker     Smokeless tobacco: Never Used     Alcohol use Yes      Comment: occasionally     Drug use: No     Sexual  "activity: Not Currently     Partners: Female     Birth control/ protection: Condom     Other Topics Concern     Not on file     Social History Narrative       Family History  Family History   Problem Relation Age of Onset     Hyperlipidemia Father      Hypertension Father        Review of Systems    The complete review of systems is negative other than noted in the HPI or here.   Constitutional: Denies fever, chills, weight loss.  Skin: Mild daily rectal bleeding. Less pain now.  HEENT: Wears glasses for vision.  Respiratory: Denies cough or shortness of breath.  CV: Denies chest pain or irregular HR. Runs 10 miles daily.  GI: Denies abdominal pain. Constipation.  : Denies dysuria.  M/S: Some leg pain after his runs.  Temp: 98.3  F (36.8  C) Temp src: Oral BP: 122/85 Pulse: 70   Resp: 14 SpO2: 100 %         185 lbs 8 oz  5' 10.5\"   Body mass index is 26.24 kg/(m^2).       Physical Exam  Constitutional: Awake, alert, cooperative, no apparent distress, and appears stated age.  Eyes: Pupils equal, round and reactive to light, extra ocular muscles intact, sclera clear, conjunctiva normal. Glasses on.  HENT: Normocephalic, oral pharynx with moist mucus membranes, good dentition. No goiter appreciated.   Respiratory: Clear to auscultation bilaterally, no crackles or wheezing. No cough or obvious dyspnea.  Cardiovascular: Regular rate and rhythm, normal S1 and S2, and no murmur noted. Carotids, no bruits. No edema. Palpable pulses to radial  DP and PT arteries.   GI: Normal bowel sounds, soft, non-distended, non-tender, no masses palpated, no hepatosplenomegaly.    Lymph/Hematologic: No cervical lymphadenopathy and no supraclavicular lymphadenopathy.  Genitourinary: Deferred.  Skin: Warm and dry.  No rashes at anticipated surgical site.   Musculoskeletal: Full ROM of neck. There is no redness, warmth, or swelling of the joints. Gross motor strength is normal.    Neurologic: Awake, alert, oriented to name, place and time. " Cranial nerves II-XII are grossly intact. Gait is normal.   Neuropsychiatric: Calm, cooperative. Normal affect.     Labs: Not indicated.  EKG: Not indicated.    Outside records reviewed from: [facility]    ASSESSMENT and PLAN  Carlee Trejo is a 26 year old male scheduled to undergo hemorrhoidectomy, date TBD by CRS. He has the following specific operative considerations:   - RCRI : No serious cardiac risks.    - Anesthesia considerations:  Refer to PAC assessment in anesthesia records  - Risk of PONV score = 2.  If > 2, anti-emetic intervention recommended.     --Prolapsing internal hemorrhoids and anal ulcer discovered on exam for rectal pain and bleeding. Above procedure now planned for further evaluation and management.   --Generally healthy male with no significant cardiopulmonary history. Nonsmoker. Good activity tolerance. Runs 10 miles frequently.  NPO, shower and medication instructions provided by nursing staff today. Preparing For Your Surgery handout given.   Patient was discussed with Dr Lei.    SPENCER Pryor CNS  Preoperative Assessment Center  [facility] and [facility]  Phone: [phone]  Fax: [phone]

## 2017-08-28 ENCOUNTER — OFFICE VISIT (OUTPATIENT)
Dept: SURGERY | Facility: CLINIC | Age: 27
End: 2017-08-28

## 2017-08-28 VITALS
TEMPERATURE: 98.7 F | HEIGHT: 71 IN | HEART RATE: 78 BPM | SYSTOLIC BLOOD PRESSURE: 125 MMHG | OXYGEN SATURATION: 100 % | WEIGHT: 185.6 LBS | DIASTOLIC BLOOD PRESSURE: 72 MMHG | BODY MASS INDEX: 25.98 KG/M2

## 2017-08-28 DIAGNOSIS — K64.4 EXTERNAL HEMORRHOIDS: Primary | ICD-10-CM

## 2017-08-28 ASSESSMENT — PAIN SCALES - GENERAL: PAINLEVEL: MILD PAIN (2)

## 2017-08-28 ASSESSMENT — ENCOUNTER SYMPTOMS
BLOOD IN STOOL: 0
BLOATING: 0
RECTAL PAIN: 1
JAUNDICE: 0
DIARRHEA: 0
RECTAL BLEEDING: 1
CONSTIPATION: 1
NAUSEA: 0
HEARTBURN: 0
ABDOMINAL PAIN: 0
VOMITING: 0
BOWEL INCONTINENCE: 0

## 2017-08-28 NOTE — NURSING NOTE
"No chief complaint on file.      Vitals:    08/28/17 1506   BP: 125/72   Pulse: 78   Temp: 98.7  F (37.1  C)   TempSrc: Oral   SpO2: 100%   Weight: 185 lb 9.6 oz   Height: 5' 10.5\"       Body mass index is 26.25 kg/(m^2).    Sourav MORTON LPN                        "

## 2017-08-28 NOTE — LETTER
"2017       RE: Carlee Trejo  [address]  [facility] [address]     Dear Colleague,    Thank you for referring your patient, Carlee Trejo, to the [facility] COLON AND RECTAL SURGERY at [facility]. Please see a copy of my visit note below.    Colon and Rectal Surgery Clinic Note    RE: Carlee Trejo.  : 1990.  ARON: 2017.    Reason for visit: Mixed hemorrhoids.    HPI: 27 y/o M with a chronic h/o hemorrhoidal disease characterized by perianal irritation, BRBPR, and anal protrusion. Was managed with high fiber diet including fiber supplement and PRN laxatives with improvement with regards to the bleeding and pain but still has daily anal protrusion with BMs that requires manual reduction. On his last evaluation, he was diagnosed with a small anal ulcer in-between the large hemorrhoid columns as well. Denies fevers, chills, abdominal pain, diarrhea, urinary symptoms, or fecal incontinence. Currently having 2-3 soft BMs per day. No previous anorectal operations or colonoscopy. Denies FH of CRC, polyps, or IBD. Does not take ASA or anticoagulants.    Medical history:  Past Medical History:   Diagnosis Date     Anal ulcer      Hemorrhoids        Surgical history:  No past surgical history on file.    Family history:  Family History   Problem Relation Age of Onset     Hyperlipidemia Father      Hypertension Father        Medications:  No current outpatient prescriptions on file.       Allergies:  No Known Allergies    Social history:   Social History   Substance Use Topics     Smoking status: Never Smoker     Smokeless tobacco: Never Used     Alcohol use Yes      Comment: occasionally     Marital status: unknown.    Physical Examination:  /72  Pulse 78  Temp 98.7  F (37.1  C) (Oral)  Ht 5' 10.5\"  Wt 185 lb 9.6 oz  SpO2 100%  BMI 26.25 kg/m2  General: Well hydrated. No acute distress.  HEENT: Normocephalic, EOM's intact. Non " icteric conjunctiva. EAC's with no abnormalities. Oral mucosa well hydrated, with no exudates visualized. No cervical adenopathy palpated.  Chest: RRR. S1 and S2 normal. No murmurs. Good breath sounds bilaterally with no rhonchi or wheezing.   Abdomen: Soft, Nt. No inguinal adenopathy palpated.  Extremities: Normotrophic. Distal pulses intact. ROM intact.  Perianal external examination:  Perianal skin: intact.  Lesions: No.  Eversion of buttocks: There was not evidence of an anal fissure. Details: N/A.  Skin tags or external hemorrhoids: Yes: large external hemorrhoids with oozing from prolapsed internal hemorrhoid. Largest columns are at the right anterior and left lateral positions.  Digital rectal examination: Was deferred.    Investigations:  None.    Procedures:  None.    ASSESSMENT  25 y/o M with large and symptomatic mixed hemorrhoids with impressive external component that has failed to improve with nonoperative management. Risks, benefits, and alternatives of operative treatment were thoroughly discussed with the patient, he/she understands these well and agrees to proceed.    PLAN  1. Schedule for Flex Sig and hemorrhoidectomy. Will need PAC and 2 Fleets preop.    Time spent: 60 minutes.  >50% spent in discussing, counseling and coordinating care.    Sage Rojas M.D., M.Sc.     Division of Colon and Rectal Surgery  [facility] [place]    Referring Provider:  Ashley Ley, APRN CNP  [address]  [place] [address]     Primary Care Provider:  Verified, No Ref-Primary

## 2017-08-28 NOTE — PROGRESS NOTES
"Colon and Rectal Surgery Clinic Note    RE: Carlee Trejo.  : 1990.  ARON: 2017.    Reason for visit: Mixed hemorrhoids.    HPI: 25 y/o M with a chronic h/o hemorrhoidal disease characterized by perianal irritation, BRBPR, and anal protrusion. Was managed with high fiber diet including fiber supplement and PRN laxatives with improvement with regards to the bleeding and pain but still has daily anal protrusion with BMs that requires manual reduction. On his last evaluation, he was diagnosed with a small anal ulcer in-between the large hemorrhoid columns as well. Denies fevers, chills, abdominal pain, diarrhea, urinary symptoms, or fecal incontinence. Currently having 2-3 soft BMs per day. No previous anorectal operations or colonoscopy. Denies FH of CRC, polyps, or IBD. Does not take ASA or anticoagulants.    Medical history:  Past Medical History:   Diagnosis Date     Anal ulcer      Hemorrhoids        Surgical history:  No past surgical history on file.    Family history:  Family History   Problem Relation Age of Onset     Hyperlipidemia Father      Hypertension Father        Medications:  No current outpatient prescriptions on file.       Allergies:  No Known Allergies    Social history:   Social History   Substance Use Topics     Smoking status: Never Smoker     Smokeless tobacco: Never Used     Alcohol use Yes      Comment: occasionally     Marital status: unknown.    Physical Examination:  /72  Pulse 78  Temp 98.7  F (37.1  C) (Oral)  Ht 5' 10.5\"  Wt 185 lb 9.6 oz  SpO2 100%  BMI 26.25 kg/m2  General: Well hydrated. No acute distress.  HEENT: Normocephalic, EOM's intact. Non icteric conjunctiva. EAC's with no abnormalities. Oral mucosa well hydrated, with no exudates visualized. No cervical adenopathy palpated.  Chest: RRR. S1 and S2 normal. No murmurs. Good breath sounds bilaterally with no rhonchi or wheezing.   Abdomen: Soft, Nt. No inguinal adenopathy palpated.  Extremities: " Normotrophic. Distal pulses intact. ROM intact.  Perianal external examination:  Perianal skin: intact.  Lesions: No.  Eversion of buttocks: There was not evidence of an anal fissure. Details: N/A.  Skin tags or external hemorrhoids: Yes: large external hemorrhoids with oozing from prolapsed internal hemorrhoid. Largest columns are at the right anterior and left lateral positions.  Digital rectal examination: Was deferred.    Investigations:  None.    Procedures:  None.    ASSESSMENT  27 y/o M with large and symptomatic mixed hemorrhoids with impressive external component that has failed to improve with nonoperative management. Risks, benefits, and alternatives of operative treatment were thoroughly discussed with the patient, he/she understands these well and agrees to proceed.    PLAN  1. Schedule for Flex Sig and hemorrhoidectomy. Will need PAC and 2 Fleets preop.    Time spent: 60 minutes.  >50% spent in discussing, counseling and coordinating care.    Sage Rojas M.D., M.Sc.     Division of Colon and Rectal Surgery  [facility]    Referring Provider:  Ashley Ley, SPENCER CNP  [address]  [place] [address]     Primary Care Provider:  Verified, No Ref-Primary

## 2017-08-28 NOTE — MR AVS SNAPSHOT
After Visit Summary   8/28/2017    Carlee Trejo    MRN: [id number]           Patient Information     Date Of Birth          1990        Visit Information        Provider Department      8/28/2017 3:00 PM Sage Rojas MD [facility] Colon and Rectal Surgery        Today's Diagnoses     External hemorrhoids    -  1       Follow-ups after your visit        Your next 10 appointments already scheduled     Aug 30, 2017   Procedure with Sage Rojas MD   [facility] ([facility])    [address] [place] [address]  5th [facility] [id number] [phone]           Located in the Clinics and Surgery Center at [address] [place] [address].   parking is very convenient and highly recommended.  is a $6 flat rate fee.  Both  and self parkers should enter the main arrival plaza from [facility]; parking attendants will direct you based on your parking preference.              Who to contact     Please call your clinic at [phone] to:    Ask questions about your health    Make or cancel appointments    Discuss your medicines    Learn about your test results    Speak to your doctor   If you have compliments or concerns about an experience at your clinic, or if you wish to file a complaint, please contact [facility] Patient Relations at [phone] or email us at [email or web address]         Additional Information About Your Visit        MyChart Information     SlideShare is an electronic gateway that provides easy, online access to your medical records. With SlideShare, you can request a clinic appointment, read your test results, renew a prescription or communicate with your care team.     To sign up for Skydeck[employer] visit the website at [email or web address]   You will be asked to enter the access code listed below, as well as some personal information. Please  "follow the directions to create your username and password.     Your access code is: [email or web address]  Expires: 2017  6:30 AM     Your access code will  in 90 days. If you need help or a new code, please contact your [facility] or call [phone] for assistance.        Care EveryWhere ID     This is your Care EveryWhere ID. This could be used by other organizations to access your [place] medical records  [id number]        Your Vitals Were     Pulse Temperature Height Pulse Oximetry BMI (Body Mass Index)       78 98.7  F (37.1  C) (Oral) 5' 10.5\" 100% 26.25 kg/m2        Blood Pressure from Last 3 Encounters:   17 125/72   17 122/85   17 119/70    Weight from Last 3 Encounters:   17 185 lb 9.6 oz   17 185 lb 8 oz   17 183 lb 1.6 oz              Today, you had the following     No orders found for display       Primary Care Provider    No Ref-Primary Verified       No address on file        Equal Access to Services     GARRY CLINE : Hadii jaxon Beth, waaxda lualiza, qaybta kaalmamary cuevas, itz bundy . So [facility] [phone].    ATENCIÓN: Si habla español, tiene a adam disposición servicios gratuitos de asistencia lingüística. Luis al [phone].    We comply with applicable federal civil rights laws and [place] laws. We do not discriminate on the basis of race, color, national origin, age, disability sex, sexual orientation or gender identity.            Thank you!     Thank you for choosing [facility] COLON AND RECTAL SURGERY  for your care. Our goal is always to provide you with excellent care. Hearing back from our patients is one way we can continue to improve our services. Please take a few minutes to complete the written survey that you may receive in the mail after your visit with us. Thank you!             Your Updated Medication List - Protect others around you: Learn how to safely use, " store and throw away your medicines at www.disposemymeds.org.      Notice  As of 8/28/2017  3:46 PM    You have not been prescribed any medications.

## 2017-08-29 ENCOUNTER — ANESTHESIA EVENT (OUTPATIENT)
Dept: SURGERY | Facility: AMBULATORY SURGERY CENTER | Age: 27
End: 2017-08-29

## 2017-08-29 NOTE — ANESTHESIA PREPROCEDURE EVALUATION
Anesthesia Evaluation     . Pt has not had prior anesthetic            ROS/MED HX    ENT/Pulmonary:  - neg pulmonary ROS     Neurologic:  - neg neurologic ROS     Cardiovascular:  - neg cardiovascular ROS   (+) ----. : . . . :. . No previous cardiac testing       METS/Exercise Tolerance: Comment: Runs 10 miles without difficulty >4 METS   Hematologic:  - neg hematologic  ROS       Musculoskeletal:  - neg musculoskeletal ROS       GI/Hepatic:  - neg GI/hepatic ROS       Renal/Genitourinary:  - ROS Renal section negative       Endo:  - neg endo ROS       Psychiatric:  - neg psychiatric ROS       Infectious Disease:  - neg infectious disease ROS       Malignancy:      - no malignancy   Other:    (+) No chance of pregnancy C-spine cleared: N/A, no H/O Chronic Pain,no other significant disability                    Physical Exam  Normal systems: dental    Airway   Mallampati: III  TM distance: >3 FB  Neck ROM: full  Comment: Large tongue    Dental     Cardiovascular   Rhythm and rate: regular and normal      Pulmonary    breath sounds clear to auscultation    Other findings: For further details of assessment, testing, and physical exam please see H and P completed on same date.             PAC Discussion and Assessment    ASA Classification: 1  Case is suitable for: ASC  Anesthetic techniques and relevant risks discussed: MAC with GA as backup  Invasive monitoring and risk discussed: No  Types:   Possibility and Risk of blood transfusion discussed: No  NPO instructions given:   Additional anesthetic preparation and risks discussed:   Needs early admission to pre-op area:   Other:     PAC Resident/NP Anesthesia Assessment:  Carlee Trejo is a 26 year old male scheduled to undergo hemorrhoidectomy, date TBD by CRS. He has the following specific operative considerations:   - RCRI : No serious cardiac risks.    - Anesthesia considerations:  Refer to PAC assessment in anesthesia records  - Risk of PONV score = 2.  If  > 2, anti-emetic intervention recommended.    No GA or sedation history.     --Prolapsing internal hemorrhoids and anal ulcer discovered on exam for rectal pain and bleeding. Above procedure now planned for further evaluation and management.   --Generally healthy male with no significant cardiopulmonary history. Nonsmoker. Good activity tolerance. Runs 10 miles frequently.     Patient was discussed with Dr Lei.      Reviewed and Signed by PAC Mid-Level Provider/Resident  Mid-Level Provider/Resident: SPENCER Fowler CNS  Date: 8/21/17  Time: 9:16am    Attending Anesthesiologist Anesthesia Assessment:        Anesthesiologist:   Date:   Time:   Pass/Fail:   Disposition:     PAC Pharmacist Assessment:        Pharmacist:   Date:   Time:      Anesthesia Plan      History & Physical Review  History and physical reviewed and following examination; no interval change.    ASA Status:  1 .    NPO Status:  > 6 hours    Plan for MAC with Intravenous and Propofol induction. Maintenance will be TIVA.  Reason for MAC:  Deep or markedly invasive procedure (G8)  PONV prophylaxis:  Ondansetron (or other 5HT-3) and Dexamethasone or Solumedrol       Postoperative Care  Postoperative pain management:  IV analgesics, Oral pain medications and Multi-modal analgesia.      Consents  Anesthetic plan, risks, benefits and alternatives discussed with:  Patient..          ANESTHESIA PREOP EVALUATION    PROCEDURE: Procedure(s):  Examination Under Anesthesia of Anus, Hemorroidectomy Internal, Flexible Sigmoidoscopy - Wound Class:    - Wound Class:    - Wound Class:     HPI: Carlee Trejo is a 26 year old male who presents for above procedure.    PAST MEDICAL HISTORY:    Past Medical History:   Diagnosis Date     Anal ulcer      Hemorrhoids        PAST SURGICAL HISTORY:    No past surgical history on file.    PAST ANESTHESIA HISTORY:     No personal or family h/o anesthesia problems    SOCIAL HISTORY:       Social History    Substance Use Topics     Smoking status: Never Smoker     Smokeless tobacco: Never Used     Alcohol use Yes      Comment: occasionally       ALLERGIES:     No Known Allergies    MEDICATIONS:       (Not in a hospital admission)    No current outpatient prescriptions on file.       No current Epic-ordered outpatient prescriptions on file.     No current Epic-ordered facility-administered medications on file.        PHYSICAL EXAM:    Vitals: T Data Unavailable, P Data Unavailable, BP Data Unavailable, R Data Unavailable, SpO2  , Weight Wt Readings from Last 2 Encounters:   08/28/17 84.2 kg (185 lb 9.6 oz)   08/21/17 84.1 kg (185 lb 8 oz)       See doc flowsheet      No Data Recorded    No Data Recorded    No Data Recorded    No Data Recorded    No Data Recorded    No data recorded.  No data recorded.      NPO STATUS: see doc flowsheet    LABS:    BMP:  No results for input(s): NA, POTASSIUM, CHLORIDE, CO2, BUN, CR, GLC, RUBIO in the last 17533 hours.    LFTs:   No results for input(s): PROTTOTAL, ALBUMIN, BILITOTAL, ALKPHOS, AST, ALT, BILIDIRECT in the last 65625 hours.    CBC:   No results for input(s): WBC, RBC, HGB, HCT, MCV, MCH, MCHC, RDW, PLT in the last 98399 hours.    Coags:  No results for input(s): INR, PTT, FIBR in the last 29982 hours.    Imaging:  No orders to display       Chele Gay MD  Anesthesiology Staff  Pager [phone]    8/29/2017  1:56 PM                    .

## 2017-08-30 ENCOUNTER — ANESTHESIA (OUTPATIENT)
Dept: SURGERY | Facility: AMBULATORY SURGERY CENTER | Age: 27
End: 2017-08-30

## 2017-08-30 ENCOUNTER — SURGERY (OUTPATIENT)
Age: 27
End: 2017-08-30

## 2017-08-30 ENCOUNTER — HOSPITAL ENCOUNTER (OUTPATIENT)
Facility: AMBULATORY SURGERY CENTER | Age: 27
End: 2017-08-30
Attending: COLON & RECTAL SURGERY

## 2017-08-30 VITALS
SYSTOLIC BLOOD PRESSURE: 103 MMHG | HEIGHT: 71 IN | RESPIRATION RATE: 18 BRPM | TEMPERATURE: 97.6 F | DIASTOLIC BLOOD PRESSURE: 62 MMHG | WEIGHT: 185.6 LBS | OXYGEN SATURATION: 99 % | BODY MASS INDEX: 25.98 KG/M2 | HEART RATE: 66 BPM

## 2017-08-30 DIAGNOSIS — K64.9 HEMORRHOIDS, UNSPECIFIED HEMORRHOID TYPE: Primary | ICD-10-CM

## 2017-08-30 RX ORDER — LIDOCAINE 40 MG/G
CREAM TOPICAL
Status: DISCONTINUED | OUTPATIENT
Start: 2017-08-30 | End: 2017-08-30 | Stop reason: HOSPADM

## 2017-08-30 RX ORDER — KETAMINE HYDROCHLORIDE 10 MG/ML
INJECTION, SOLUTION INTRAMUSCULAR; INTRAVENOUS PRN
Status: DISCONTINUED | OUTPATIENT
Start: 2017-08-30 | End: 2017-08-30

## 2017-08-30 RX ORDER — CHLORHEXIDINE GLUCONATE 40 MG/ML
SOLUTION TOPICAL ONCE
Status: DISCONTINUED | OUTPATIENT
Start: 2017-08-30 | End: 2017-08-30 | Stop reason: HOSPADM

## 2017-08-30 RX ORDER — ACETAMINOPHEN 325 MG/1
975 TABLET ORAL ONCE
Status: COMPLETED | OUTPATIENT
Start: 2017-08-30 | End: 2017-08-30

## 2017-08-30 RX ORDER — CELECOXIB 200 MG/1
200 CAPSULE ORAL ONCE
Status: COMPLETED | OUTPATIENT
Start: 2017-08-30 | End: 2017-08-30

## 2017-08-30 RX ORDER — PROPOFOL 10 MG/ML
INJECTION, EMULSION INTRAVENOUS PRN
Status: DISCONTINUED | OUTPATIENT
Start: 2017-08-30 | End: 2017-08-30

## 2017-08-30 RX ORDER — POLYETHYLENE GLYCOL 3350 17 G/17G
1 POWDER, FOR SOLUTION ORAL DAILY PRN
Qty: 500 G | Refills: 0 | Status: SHIPPED | OUTPATIENT
Start: 2017-08-30 | End: 2017-09-27

## 2017-08-30 RX ORDER — ACETAMINOPHEN 325 MG/1
975 TABLET ORAL ONCE
Status: DISCONTINUED | OUTPATIENT
Start: 2017-08-30 | End: 2017-08-30 | Stop reason: HOSPADM

## 2017-08-30 RX ORDER — ONDANSETRON 4 MG/1
4 TABLET, ORALLY DISINTEGRATING ORAL
Status: DISCONTINUED | OUTPATIENT
Start: 2017-08-30 | End: 2017-08-31 | Stop reason: HOSPADM

## 2017-08-30 RX ORDER — CIPROFLOXACIN 2 MG/ML
400 INJECTION, SOLUTION INTRAVENOUS
Status: COMPLETED | OUTPATIENT
Start: 2017-08-30 | End: 2017-08-30

## 2017-08-30 RX ORDER — TRAMADOL HYDROCHLORIDE 50 MG/1
50-100 TABLET ORAL
Qty: 20 TABLET | Refills: 0 | Status: SHIPPED | OUTPATIENT
Start: 2017-08-30 | End: 2017-09-27

## 2017-08-30 RX ORDER — NALOXONE HYDROCHLORIDE 0.4 MG/ML
.1-.4 INJECTION, SOLUTION INTRAMUSCULAR; INTRAVENOUS; SUBCUTANEOUS
Status: DISCONTINUED | OUTPATIENT
Start: 2017-08-30 | End: 2017-08-31 | Stop reason: HOSPADM

## 2017-08-30 RX ORDER — IBUPROFEN 800 MG/1
800 TABLET, FILM COATED ORAL 3 TIMES DAILY
Qty: 30 TABLET | Refills: 0 | Status: SHIPPED | OUTPATIENT
Start: 2017-08-30 | End: 2017-09-09

## 2017-08-30 RX ORDER — ACETAMINOPHEN 325 MG/1
650 TABLET ORAL
Status: DISCONTINUED | OUTPATIENT
Start: 2017-08-30 | End: 2017-08-31 | Stop reason: HOSPADM

## 2017-08-30 RX ORDER — DEXAMETHASONE SODIUM PHOSPHATE 4 MG/ML
INJECTION, SOLUTION INTRA-ARTICULAR; INTRALESIONAL; INTRAMUSCULAR; INTRAVENOUS; SOFT TISSUE PRN
Status: DISCONTINUED | OUTPATIENT
Start: 2017-08-30 | End: 2017-08-30

## 2017-08-30 RX ORDER — METRONIDAZOLE 250 MG/1
250 TABLET ORAL 3 TIMES DAILY
Qty: 15 TABLET | Refills: 0 | Status: SHIPPED | OUTPATIENT
Start: 2017-08-30 | End: 2017-09-04

## 2017-08-30 RX ORDER — KETOROLAC TROMETHAMINE 30 MG/ML
30 INJECTION, SOLUTION INTRAMUSCULAR; INTRAVENOUS EVERY 6 HOURS PRN
Status: DISCONTINUED | OUTPATIENT
Start: 2017-08-30 | End: 2017-08-31 | Stop reason: HOSPADM

## 2017-08-30 RX ORDER — BUPIVACAINE HYDROCHLORIDE 2.5 MG/ML
INJECTION, SOLUTION EPIDURAL; INFILTRATION; INTRACAUDAL PRN
Status: DISCONTINUED | OUTPATIENT
Start: 2017-08-30 | End: 2017-08-30 | Stop reason: HOSPADM

## 2017-08-30 RX ORDER — MEPERIDINE HYDROCHLORIDE 25 MG/ML
12.5 INJECTION INTRAMUSCULAR; INTRAVENOUS; SUBCUTANEOUS
Status: DISCONTINUED | OUTPATIENT
Start: 2017-08-30 | End: 2017-08-31 | Stop reason: HOSPADM

## 2017-08-30 RX ORDER — FENTANYL CITRATE 50 UG/ML
INJECTION, SOLUTION INTRAMUSCULAR; INTRAVENOUS PRN
Status: DISCONTINUED | OUTPATIENT
Start: 2017-08-30 | End: 2017-08-30

## 2017-08-30 RX ORDER — ONDANSETRON 4 MG/1
4 TABLET, ORALLY DISINTEGRATING ORAL EVERY 30 MIN PRN
Status: DISCONTINUED | OUTPATIENT
Start: 2017-08-30 | End: 2017-08-31 | Stop reason: HOSPADM

## 2017-08-30 RX ORDER — GLYCOPYRROLATE 0.2 MG/ML
INJECTION, SOLUTION INTRAMUSCULAR; INTRAVENOUS PRN
Status: DISCONTINUED | OUTPATIENT
Start: 2017-08-30 | End: 2017-08-30

## 2017-08-30 RX ORDER — SODIUM CHLORIDE, SODIUM LACTATE, POTASSIUM CHLORIDE, CALCIUM CHLORIDE 600; 310; 30; 20 MG/100ML; MG/100ML; MG/100ML; MG/100ML
INJECTION, SOLUTION INTRAVENOUS CONTINUOUS PRN
Status: DISCONTINUED | OUTPATIENT
Start: 2017-08-30 | End: 2017-08-30

## 2017-08-30 RX ORDER — ACETAMINOPHEN 325 MG/1
1000 TABLET ORAL 4 TIMES DAILY
Qty: 120 TABLET | Refills: 0 | Status: SHIPPED | OUTPATIENT
Start: 2017-08-30 | End: 2017-09-09

## 2017-08-30 RX ORDER — SODIUM CHLORIDE 9 MG/ML
INJECTION, SOLUTION INTRAVENOUS CONTINUOUS
Status: DISCONTINUED | OUTPATIENT
Start: 2017-08-30 | End: 2017-08-30 | Stop reason: HOSPADM

## 2017-08-30 RX ORDER — ONDANSETRON 2 MG/ML
4 INJECTION INTRAMUSCULAR; INTRAVENOUS EVERY 30 MIN PRN
Status: DISCONTINUED | OUTPATIENT
Start: 2017-08-30 | End: 2017-08-31 | Stop reason: HOSPADM

## 2017-08-30 RX ORDER — SILVER SULFADIAZINE 10 MG/G
CREAM TOPICAL PRN
Status: DISCONTINUED | OUTPATIENT
Start: 2017-08-30 | End: 2017-08-30 | Stop reason: HOSPADM

## 2017-08-30 RX ORDER — OXYCODONE HYDROCHLORIDE 5 MG/1
5 TABLET ORAL EVERY 4 HOURS PRN
Status: DISCONTINUED | OUTPATIENT
Start: 2017-08-30 | End: 2017-08-30 | Stop reason: HOSPADM

## 2017-08-30 RX ORDER — SODIUM CHLORIDE, SODIUM LACTATE, POTASSIUM CHLORIDE, CALCIUM CHLORIDE 600; 310; 30; 20 MG/100ML; MG/100ML; MG/100ML; MG/100ML
INJECTION, SOLUTION INTRAVENOUS CONTINUOUS
Status: DISCONTINUED | OUTPATIENT
Start: 2017-08-30 | End: 2017-08-31 | Stop reason: HOSPADM

## 2017-08-30 RX ORDER — CIPROFLOXACIN 2 MG/ML
400 INJECTION, SOLUTION INTRAVENOUS SEE ADMIN INSTRUCTIONS
Status: DISCONTINUED | OUTPATIENT
Start: 2017-08-30 | End: 2017-08-30 | Stop reason: HOSPADM

## 2017-08-30 RX ORDER — ONDANSETRON 2 MG/ML
INJECTION INTRAMUSCULAR; INTRAVENOUS PRN
Status: DISCONTINUED | OUTPATIENT
Start: 2017-08-30 | End: 2017-08-30

## 2017-08-30 RX ORDER — LIDOCAINE HYDROCHLORIDE 20 MG/ML
INJECTION, SOLUTION INFILTRATION; PERINEURAL PRN
Status: DISCONTINUED | OUTPATIENT
Start: 2017-08-30 | End: 2017-08-30

## 2017-08-30 RX ORDER — FENTANYL CITRATE 50 UG/ML
25-50 INJECTION, SOLUTION INTRAMUSCULAR; INTRAVENOUS
Status: DISCONTINUED | OUTPATIENT
Start: 2017-08-30 | End: 2017-08-30 | Stop reason: HOSPADM

## 2017-08-30 RX ORDER — SILVER SULFADIAZINE 10 MG/G
CREAM TOPICAL 3 TIMES DAILY
Qty: 50 G | Refills: 0 | Status: SHIPPED | OUTPATIENT
Start: 2017-08-30 | End: 2017-09-06

## 2017-08-30 RX ORDER — OXYCODONE HYDROCHLORIDE 5 MG/1
5-10 TABLET ORAL EVERY 4 HOURS PRN
Qty: 40 TABLET | Refills: 0 | Status: SHIPPED | OUTPATIENT
Start: 2017-08-30 | End: 2017-09-27

## 2017-08-30 RX ADMIN — FENTANYL CITRATE 25 MCG: 50 INJECTION, SOLUTION INTRAMUSCULAR; INTRAVENOUS at 10:18

## 2017-08-30 RX ADMIN — ACETAMINOPHEN 975 MG: 325 TABLET ORAL at 07:25

## 2017-08-30 RX ADMIN — CIPROFLOXACIN 400 MG: 2 INJECTION, SOLUTION INTRAVENOUS at 07:39

## 2017-08-30 RX ADMIN — SILVER SULFADIAZINE 2 APPLICATOR: 10 CREAM TOPICAL at 09:43

## 2017-08-30 RX ADMIN — OXYCODONE HYDROCHLORIDE 5 MG: 5 TABLET ORAL at 10:06

## 2017-08-30 RX ADMIN — BUPIVACAINE HYDROCHLORIDE 30 ML: 2.5 INJECTION, SOLUTION EPIDURAL; INFILTRATION; INTRACAUDAL at 09:17

## 2017-08-30 RX ADMIN — FENTANYL CITRATE 50 MCG: 50 INJECTION, SOLUTION INTRAMUSCULAR; INTRAVENOUS at 09:20

## 2017-08-30 RX ADMIN — PROPOFOL 200 MG: 10 INJECTION, EMULSION INTRAVENOUS at 08:51

## 2017-08-30 RX ADMIN — GLYCOPYRROLATE 0.2 MG: 0.2 INJECTION, SOLUTION INTRAMUSCULAR; INTRAVENOUS at 08:56

## 2017-08-30 RX ADMIN — SODIUM CHLORIDE, SODIUM LACTATE, POTASSIUM CHLORIDE, CALCIUM CHLORIDE: 600; 310; 30; 20 INJECTION, SOLUTION INTRAVENOUS at 07:44

## 2017-08-30 RX ADMIN — CELECOXIB 200 MG: 200 CAPSULE ORAL at 07:25

## 2017-08-30 RX ADMIN — KETAMINE HYDROCHLORIDE 30 MG: 10 INJECTION, SOLUTION INTRAMUSCULAR; INTRAVENOUS at 09:00

## 2017-08-30 RX ADMIN — DEXAMETHASONE SODIUM PHOSPHATE 4 MG: 4 INJECTION, SOLUTION INTRA-ARTICULAR; INTRALESIONAL; INTRAMUSCULAR; INTRAVENOUS; SOFT TISSUE at 09:01

## 2017-08-30 RX ADMIN — FENTANYL CITRATE 50 MCG: 50 INJECTION, SOLUTION INTRAMUSCULAR; INTRAVENOUS at 10:21

## 2017-08-30 RX ADMIN — ONDANSETRON 4 MG: 2 INJECTION INTRAMUSCULAR; INTRAVENOUS at 09:00

## 2017-08-30 RX ADMIN — LIDOCAINE HYDROCHLORIDE 60 MG: 20 INJECTION, SOLUTION INFILTRATION; PERINEURAL at 08:51

## 2017-08-30 RX ADMIN — FENTANYL CITRATE 25 MCG: 50 INJECTION, SOLUTION INTRAMUSCULAR; INTRAVENOUS at 10:16

## 2017-08-30 RX ADMIN — ONDANSETRON 4 MG: 2 INJECTION INTRAMUSCULAR; INTRAVENOUS at 10:49

## 2017-08-30 NOTE — DISCHARGE INSTRUCTIONS
Anorectal Surgery Instructions    What can I expect after anorectal surgery?  Most anorectal procedures are done as outpatient surgery, and you go home the same day as the procedure. A few surgical procedures will require that you stay in the hospital for about one to three days. No matter where the procedure is done or how long or short it takes, these recommendations will help you heal and feel more comfortable.    Medicines:  The anal area is very sensitive; you can expect to have some pain for up to 2-4 weeks after the procedure. Your doctor will give you a prescription for one or more pain medications.    Take naprosyn 500 mg twice a day OR ibuprofen 600 mg four times a day     Take this on a regular basis (not as needed) following your surgery.     The drugs are best taken with food.  Do not take if it causes stomach upset or if you have a history of ulcers or gastritis. You can stop the naprosyn (or ibuprofen) or reduce the dose when you are feeling better.    DO NOT use naprosyn, ibuprofen, or other similar agents (eg. Advil or Aleve) if you have inflammatory bowel disease (Ulcerative Colitis or Crohn's disease) or if your doctor as advised you against using these medications    Take acetominaphen (Tylenol) 650-1000 mg four times a day.     Take this on a regular basis (not as needed) following surgery for pain control.     Take the lower dose if you are >65 years old or have liver disease. The maximum dose of acetominaphen is 4000 mg a day. You can stop the acetaminophen or reduce the dose when you are feeling better.    It is important to realize that many narcotic pain relievers (including vicodin, percocet, tylenol #3) also have acetaminophen, and excessive doses of acetaminophen can be dangerous, so do not take these in addition to acetominaphen.  You may take narcotics that don't contain acetominaphen such as oxycodone.      Take oxycodone AS NEEDED in addition to the acetominaphen and naprosyn.       Because narcotics have side effects (including constipation), you should reduce your use of these medications as tolerated as your pain improves.    *In general, the best strategy is to take (if you are able to tolerate it) the tylenol and naprosen on a regular basis until your pain has largely gone away. You can take the narcotic pain medicine as needed in addition to the tylenol and ibuprofen. As your pain begins to lessen, you should cut back on your narcotic use while continuing to take your regular tylenol and naprosyn doses.      Refilling prescriptions. If you need additional pain medication, please call the triage nurse at [phone] during normal business hours (8 a.m. to 4 p.m., Monday though Friday) or have your pharmacy fax a refill request to [phone]. If you call after hours or on the weekends, the doctor on call may not know you personally and may not renew narcotic pain medication by phone. Call your primary care provider for all other medication refills.    Perineal care:  Tub baths:    If possible, take a tub bath immediately after each bowel movement.     Baths should be take at least 3 times daily for the first week to 10 days following your procedure. You should soak in the tub for 10 to 15 minutes each time with water as warm as you can tolerate.     Even after you go back to work, it is a good idea to sit in the tub in the morning, after returning from work, and again in the evening before bedtime.    Bleeding/Infection:    You can expect to have some bleeding after bowel movements, but it should stop soon after you wipe.     Use a wet cloth or perianal pad (Tucks or Preparation H pads) to gently wipe the area after each bowel movement.    Do not rub the anal area or use a lot of pressure.    Using a spray bottle filled with warm water helps loosen any remaining stool. Blot gently with a soft dry cloth or tissue paper.    Infection around the anal opening is not very common. The anal  area has excellent blood supply, which helps the area to heal. Bloody discharge after bowel movements is normal and may last 2 to 4 weeks after your surgery. However, if you bleed between bowel movements and cannot get it to stop, call the triage nurse immediately [phone].    Bowel function:  Take a fiber supplement such as Metamucil, which is over the counter. It is important to drink six to eight glasses of water or juice everyday when using fiber products.    If you do not have a bowel movement after 1-2 days:    Take Milk of Magnesia-2 tablespoons.       If there are no results, repeat this or add over the counter Miralax.      If you still do not have results, contact the clinic.     If there are no results, repeat this. Stop taking Milk of Magnesia or other laxatives if you begin to have diarrhea.    * Constipation will cause you to strain when you have a bowel movement. The hard stool will be difficult to pass, will increase pain and bleeding, and will slow down healing.  Try to avoid constipation and/or diarrhea as this can make the pain and bleeding worse.    * It is important to have regular bowel movements at least every other day and to keep your stool soft.  A high fiber diet, including at least four servings of fruits or vegetables daily, will help to keep your bowel movements regular and soft.    Activity:  After your procedure, there are no restrictions on your activity     except restrictions surrounding being on narcotics and in pain, such as no heavy machine operating or driving.     You may walk, climb stairs, ride in a car, and sit as tolerated.     It is helpful to avoid sitting in one position for long periods (2 or more hours).    After some surgeries, you may be told not to perform any lifting (more than 10 pounds) for several weeks after surgery.    When to call:  When do I need to call the doctor or triage nurse?    If you experience any of the problems listed here, call our triage  nurse during business hours ([phone]).     The nurse will help you with your problem or have the doctor call you.     After hours and on weekends, please call the main hospital number ([phone]) and ask for the colon and rectal surgery person on call.     Some is available to help you 24 hours a day, seven days a week.    Call for:   ? Fever greater than 101 degrees   ? Chills   ? Foul-smelling drainage   ? Nausea and vomiting   ? Diarrhea - greater than 3 water stools in 24 hours   ? Constipation - no bowel movement after 3 days   ? Severe bleeding that does not stop soon after a bowel movement   ? Problems with the incision, including increased pain, swelling, or redness    [facility]  Home Care Following Anesthesia  For 24 hours after surgery:  1. Get plenty of rest.  A responsible adult must stay with you for at least 24 hours after you leave the surgery center.  2. Do not drive or use heavy equipment.  If you have weakness or tingling, don't drive or use heavy equipment until this feeling goes away.   3. Do not drink alcohol.   4. Avoid strenuous or risky activities.  Ask for help when climbing stairs.  5. You may feel lightheaded.  IF so, sit for a few minutes before standing.  Have someone help you get up.   6. If you have nausea (feel sick to your stomach): Drink only clear liquids such as apple juice, ginger ale, broth or 7-Up.  Rest may also help.  Be sure to drink enough fluids.  Move to a regular diet as you feel able.   7. You may have a slight fever.  Call the doctor if your fever is over 100 F (37.7 C) (taken under the tongue) or lasts longer than 24 hours.  8. You may have a dry mouth, a sore throat, muscle aches or trouble sleeping. These should go away after 24 hours.  9. Do not make important or legal decisions.               Tips for taking pain medications  To get the best pain relief possible, remember these points:    Take pain medications as  directed, before pain becomes severe.    Pain medication can upset your stomach: taking it with food may help.    Constipation is a common side effect of pain medication. Drink plenty of  fluids.    Eat foods high in fiber. Take a stool softener if recommended by your doctor or pharmacist.    Do not drink alcohol, drive or operate machinery while taking pain medications.    Ask about other ways to control pain, such as with heat, ice or relaxation.    Tylenol/Acetaminophen Consumption  To help encourage the safe use of acetaminophen, the makers of TYLENOL  have lowered the maximum daily dose for single-ingredient Extra Strength TYLENOL  (acetaminophen) products sold in the [place] from 8 pills per day (4,000 mg) to 6 pills per day (3,000 mg). The dosing interval has also changed from 2 pills every 4-6 hours to 2 pills every 6 hours.    If you feel your pain relief is insufficient, you may take Tylenol/Acetaminophen in addition to your narcotic pain medication.     Be careful not to exceed 3,000 mg of Tylenol/Acetaminophen in a 24 hour period from all sources.    If you are taking extra strength Tylenol/acetaminophen (500 mg), the maximum dose is 6 tablets in 24 hours.    If you are taking regular strength acetaminophen (325 mg), the maximum dose is 9 tablets in 24 hours.    Call a doctor for any of the followin. Signs of infection (fever, growing tenderness at the surgery site, a large amount of drainage or bleeding, severe pain, foul-smelling drainage, redness, swelling).  2. It has been over 8 to 10 hours since surgery and you are still not able to urinate (pass water).  3. Headache for over 24 hours.  4. Numbness, tingling or weakness the day after surgery (if you had spinal anesthesia).  Your doctor is:  Dr. Sage Rojas, Colon Rectal: [phone]                    Or dial [phone] and ask for the resident on call for:  Colon Rectal  For emergency care, call the:  [facility]:   [phone] (TTY for hearing impaired: [phone])

## 2017-08-30 NOTE — ANESTHESIA CARE TRANSFER NOTE
Patient: Carlee Trejo    Procedure(s):  Examination Under Anesthesia of Anus, Hemorroidectomy Internal, Flexible Sigmoidoscopy - Wound Class: II-Clean Contaminated   - Wound Class: II-Clean Contaminated   - Wound Class: II-Clean Contaminated    Diagnosis: Hemorrhoids  Diagnosis Additional Information: No value filed.    Anesthesia Type:   MAC     Note:  Airway :Room Air  Patient transferred to:PACU  Comments: Arrive PACU, Stable, Airway Intact  112/91, 95,16,98,97.6  All questions answered.      Vitals: (Last set prior to Anesthesia Care Transfer)    CRNA VITALS  8/30/2017 0923 - 8/30/2017 0957      8/30/2017             Pulse: 95    SpO2: 100 %    Resp Rate (observed): 17                Electronically Signed By: SPENCER Blake CRNA  August 30, 2017  9:57 AM

## 2017-08-30 NOTE — ANESTHESIA POSTPROCEDURE EVALUATION
Patient: Carlee Trejo    Procedure(s):  Examination Under Anesthesia of Anus, Hemorroidectomy Internal, Flexible Sigmoidoscopy - Wound Class: II-Clean Contaminated   - Wound Class: II-Clean Contaminated   - Wound Class: II-Clean Contaminated    Diagnosis:Hemorrhoids  Diagnosis Additional Information: No value filed.    Anesthesia Type:  MAC    Note:  Anesthesia Post Evaluation    Patient location during evaluation: PACU  Patient participation: Able to fully participate in evaluation  Level of consciousness: awake and alert  Pain management: adequate  Airway patency: patent  Cardiovascular status: acceptable  Respiratory status: acceptable  Hydration status: acceptable  PONV: none     Anesthetic complications: None          Last vitals:  Vitals:    08/30/17 1045 08/30/17 1110 08/30/17 1135   BP: 106/67 104/54 103/62   Pulse:      Resp: 18 18 18   Temp:  36.6  C (97.8  F) 36.4  C (97.6  F)   SpO2: 100% 99% 99%         Electronically Signed By: Chele Gay MD  August 30, 2017

## 2017-08-30 NOTE — OP NOTE
"PREOPERATIVE DIAGNOSES:   1.  Hematochezia.   2.  Severe mixed hemorrhoidal disease.      POSTOPERATIVE DIAGNOSES:     1.  Hematochezia.   2.  Severe mixed hemorrhoidal disease.      ANESTHESIA:  General endotracheal anesthesia plus local anesthesia.      PROCEDURES PERFORMED:   1.  Flexible sigmoidoscopy.   2.  LigaSure hemorrhoidectomy x3.      SURGEON:  Sage Rojas MD      ASSISTANT:  Issac Mclean MD (General Surgery resident)      DESCRIPTION OF OPERATION:  After obtaining informed consent, the patient was brought to the operating room and placed in the supine position.  General endotracheal anesthesia was gently induced without difficulty.  The patient was then turned to the prone jackknife position.  He received appropriate preoperative antibiotic prophylaxis as well as mechanical DVT prophylaxis.  Bilateral lower extremity pneumatic compression devices were applied, and all pressure points were cushioned.  The buttocks were taped.  A \"time-out\" was performed.  A pediatric colonoscope was passed transanally and brought all the way up to the proximal sigmoid colon using CO2 insufflation.  The quality of the prep was good.  The colonoscope was then gently withdrawn down to the rectum.  There was no evidence of neoplasia, inflammation or bleeding.  Retroflexion was performed in the rectum, and the patient had severe internal hemorrhoids.  There was no evidence of active bleeding.  No biopsies were taken.  The colonoscope was then removed, and the perianal area was prepped and draped in the standard sterile fashion.  A total of 30 mL of bupivacaine 0.25% without epinephrine was injected as a pudendal block.  Anoscopy and digital rectal exam were performed.  No masses were palpated.  A hemorrhoidectomy was performed in the following fashion at the right posterior, left posterior and left anterior aspects of the anus.  The perianal skin was incised with monopolar electrocautery, and the fibers of the " internal and external anal sphincter mechanism were dissected off of the hemorrhoidal column.  After this, a handheld LigaSure device was used to excise the entire hemorrhoid column.  Hemostasis was achieved, and the skin defect was reapproximated with a running 3-0 Vicryl suture.  This technique was used for all 3 hemorrhoid columns excised, and all 3 hemorrhoid columns were sent for permanent pathology.  Hemostasis was corroborated.  Instrument, sponge and needle counts were all correct as reported to me.  The distal rectum and anal canal were irrigated with warm sterile water and subsequently suctioned out.  Digital rectal exam showed no evidence of anal stenosis.  Silvadene was applied as well as a sterile dressing.  The patient tolerated the procedure well.      COMPLICATIONS:  None immediately.      ESTIMATED BLOOD LOSS:  5 mL.      REPLACEMENT:  500 mL of crystalloid.     DRAINS/TUBES:  None.      SPECIMENS:  Left anterior hemorrhoid, right posterior hemorrhoid and left posterior hemorrhoid.     FINDINGS:  Normal flexible sigmoidoscopy. Full colonoscopy at age 45-50.     DISPOSITION:  PACU.         MONISHA ABBASI MD             D: 2017 10:16   T: 2017 10:36   MT: davis      Name:     ANA YARBROUGH   MRN:      [id number]        Account:        [id number]   :      1990           Procedure Date: 2017      Document: [id number]       cc: Ashley Bell MSN, NP-C       [facility] Billing

## 2017-08-30 NOTE — BRIEF OP NOTE
[facility]    Brief Operative Note    Pre-operative diagnosis: Hemorrhoids  Post-operative diagnosis * No post-op diagnosis entered *  Procedure: Procedure(s):  Examination Under Anesthesia of Anus, Hemorroidectomy Internal, Flexible Sigmoidoscopy - Wound Class: II-Clean Contaminated   - Wound Class: II-Clean Contaminated   - Wound Class: II-Clean Contaminated  Surgeon: Surgeon(s) and Role:     * Sage Rojas MD - Primary  Anesthesia: General   Estimated blood loss: Minimal  Drains: None  Specimens:   ID Type Source Tests Collected by Time Destination   A : left anterior hemorrhoid Tissue Other SURGICAL PATHOLOGY EXAM Sage Rojas MD 8/30/2017  9:21 AM    B : right posterior hemorrhoid Tissue Other SURGICAL PATHOLOGY EXAM Sage Rojas MD 8/30/2017  9:26 AM    C : left posterior hemorrhoid Tissue Other SURGICAL PATHOLOGY EXAM Sage Rojas MD 8/30/2017  9:34 AM      Findings:   Normal Flex Sig. Hemorrhoidectomy x3..  Complications: None.  Implants: None.

## 2017-08-30 NOTE — IP AVS SNAPSHOT
MRN:[id number]                      After Visit Summary   8/30/2017    Carlee Trejo    MRN: [id number]           Thank you!     Thank you for choosing [facility] for your care. Our goal is always to provide you with excellent care. Hearing back from our patients is one way we can continue to improve our services. Please take a few minutes to complete the written survey that you may receive in the mail after you visit with us. Thank you!        Patient Information     Date Of Birth          1990        About your hospital stay     You were admitted on:  August 30, 2017 [facility]    You were discharged on:  August 30, 2017       Who to Call     For medical emergencies, please call 911.  For non-urgent questions about your medical care, please call your primary care provider or clinic, None  For questions related to your surgery, please call your surgery clinic        Attending Provider     Provider Specialty    Sage Rojas MD Colon and Rectal Surgery       Primary Care Provider    No Ref-Primary Verified      After Care Instructions     Diet Instructions       Resume pre-procedure diet            Discharge Instructions       Patient to follow up with Provider or Nurse Practitioner at [facility] in 1-2 weeks.            No Alcohol       For 24 hours after procedure and if taking narcotic pain medications or Metronidazole (FLAGYL).            No driving or operating machinery       until the day after procedure, or if taking narcotic pain medications.            Sitz bath       Start sitz baths the night of surgery and perform 2-3 times per day, and after bowel movements. Sit down in 8-10 inches of warm water (no need to add soap or salts) in the bathtub for 5 minutes at a time. You can also use a removable showerhead for the same purpose.                  Further instructions from your care team        Anorectal Surgery Instructions    What can I expect after anorectal surgery?  Most anorectal procedures are done as outpatient surgery, and you go home the same day as the procedure. A few surgical procedures will require that you stay in the hospital for about one to three days. No matter where the procedure is done or how long or short it takes, these recommendations will help you heal and feel more comfortable.    Medicines:  The anal area is very sensitive; you can expect to have some pain for up to 2-4 weeks after the procedure. Your doctor will give you a prescription for one or more pain medications.    Take naprosyn 500 mg twice a day OR ibuprofen 600 mg four times a day     Take this on a regular basis (not as needed) following your surgery.     The drugs are best taken with food.  Do not take if it causes stomach upset or if you have a history of ulcers or gastritis. You can stop the naprosyn (or ibuprofen) or reduce the dose when you are feeling better.    DO NOT use naprosyn, ibuprofen, or other similar agents (eg. Advil or Aleve) if you have inflammatory bowel disease (Ulcerative Colitis or Crohn's disease) or if your doctor as advised you against using these medications    Take acetominaphen (Tylenol) 650-1000 mg four times a day.     Take this on a regular basis (not as needed) following surgery for pain control.     Take the lower dose if you are >65 years old or have liver disease. The maximum dose of acetominaphen is 4000 mg a day. You can stop the acetaminophen or reduce the dose when you are feeling better.    It is important to realize that many narcotic pain relievers (including vicodin, percocet, tylenol #3) also have acetaminophen, and excessive doses of acetaminophen can be dangerous, so do not take these in addition to acetominaphen.  You may take narcotics that don't contain acetominaphen such as oxycodone.      Take oxycodone AS NEEDED in addition to the acetominaphen and naprosyn.       Because narcotics have side effects (including constipation), you should reduce your use of these medications as tolerated as your pain improves.    *In general, the best strategy is to take (if you are able to tolerate it) the tylenol and naprosen on a regular basis until your pain has largely gone away. You can take the narcotic pain medicine as needed in addition to the tylenol and ibuprofen. As your pain begins to lessen, you should cut back on your narcotic use while continuing to take your regular tylenol and naprosyn doses.      Refilling prescriptions. If you need additional pain medication, please call the triage nurse at [phone] during normal business hours (8 a.m. to 4 p.m., Monday though Friday) or have your pharmacy fax a refill request to [phone]. If you call after hours or on the weekends, the doctor on call may not know you personally and may not renew narcotic pain medication by phone. Call your primary care provider for all other medication refills.    Perineal care:  Tub baths:    If possible, take a tub bath immediately after each bowel movement.     Baths should be take at least 3 times daily for the first week to 10 days following your procedure. You should soak in the tub for 10 to 15 minutes each time with water as warm as you can tolerate.     Even after you go back to work, it is a good idea to sit in the tub in the morning, after returning from work, and again in the evening before bedtime.    Bleeding/Infection:    You can expect to have some bleeding after bowel movements, but it should stop soon after you wipe.     Use a wet cloth or perianal pad (Tucks or Preparation H pads) to gently wipe the area after each bowel movement.    Do not rub the anal area or use a lot of pressure.    Using a spray bottle filled with warm water helps loosen any remaining stool. Blot gently with a soft dry cloth or tissue paper.    Infection around the anal opening is not very common. The anal  area has excellent blood supply, which helps the area to heal. Bloody discharge after bowel movements is normal and may last 2 to 4 weeks after your surgery. However, if you bleed between bowel movements and cannot get it to stop, call the triage nurse immediately [phone].    Bowel function:  Take a fiber supplement such as Metamucil, which is over the counter. It is important to drink six to eight glasses of water or juice everyday when using fiber products.    If you do not have a bowel movement after 1-2 days:    Take Milk of Magnesia-2 tablespoons.       If there are no results, repeat this or add over the counter Miralax.      If you still do not have results, contact the clinic.     If there are no results, repeat this. Stop taking Milk of Magnesia or other laxatives if you begin to have diarrhea.    * Constipation will cause you to strain when you have a bowel movement. The hard stool will be difficult to pass, will increase pain and bleeding, and will slow down healing.  Try to avoid constipation and/or diarrhea as this can make the pain and bleeding worse.    * It is important to have regular bowel movements at least every other day and to keep your stool soft.  A high fiber diet, including at least four servings of fruits or vegetables daily, will help to keep your bowel movements regular and soft.    Activity:  After your procedure, there are no restrictions on your activity     except restrictions surrounding being on narcotics and in pain, such as no heavy machine operating or driving.     You may walk, climb stairs, ride in a car, and sit as tolerated.     It is helpful to avoid sitting in one position for long periods (2 or more hours).    After some surgeries, you may be told not to perform any lifting (more than 10 pounds) for several weeks after surgery.    When to call:  When do I need to call the doctor or triage nurse?    If you experience any of the problems listed here, call our triage  nurse during business hours ([phone]).     The nurse will help you with your problem or have the doctor call you.     After hours and on weekends, please call the main hospital number ([phone]) and ask for the colon and rectal surgery person on call.     Some is available to help you 24 hours a day, seven days a week.    Call for:   ? Fever greater than 101 degrees   ? Chills   ? Foul-smelling drainage   ? Nausea and vomiting   ? Diarrhea - greater than 3 water stools in 24 hours   ? Constipation - no bowel movement after 3 days   ? Severe bleeding that does not stop soon after a bowel movement   ? Problems with the incision, including increased pain, swelling, or redness    [facility]  Home Care Following Anesthesia  For 24 hours after surgery:  1. Get plenty of rest.  A responsible adult must stay with you for at least 24 hours after you leave the surgery center.  2. Do not drive or use heavy equipment.  If you have weakness or tingling, don't drive or use heavy equipment until this feeling goes away.   3. Do not drink alcohol.   4. Avoid strenuous or risky activities.  Ask for help when climbing stairs.  5. You may feel lightheaded.  IF so, sit for a few minutes before standing.  Have someone help you get up.   6. If you have nausea (feel sick to your stomach): Drink only clear liquids such as apple juice, ginger ale, broth or 7-Up.  Rest may also help.  Be sure to drink enough fluids.  Move to a regular diet as you feel able.   7. You may have a slight fever.  Call the doctor if your fever is over 100 F (37.7 C) (taken under the tongue) or lasts longer than 24 hours.  8. You may have a dry mouth, a sore throat, muscle aches or trouble sleeping. These should go away after 24 hours.  9. Do not make important or legal decisions.               Tips for taking pain medications  To get the best pain relief possible, remember these points:    Take pain medications as  directed, before pain becomes severe.    Pain medication can upset your stomach: taking it with food may help.    Constipation is a common side effect of pain medication. Drink plenty of  fluids.    Eat foods high in fiber. Take a stool softener if recommended by your doctor or pharmacist.    Do not drink alcohol, drive or operate machinery while taking pain medications.    Ask about other ways to control pain, such as with heat, ice or relaxation.    Tylenol/Acetaminophen Consumption  To help encourage the safe use of acetaminophen, the makers of TYLENOL  have lowered the maximum daily dose for single-ingredient Extra Strength TYLENOL  (acetaminophen) products sold in the [place] from 8 pills per day (4,000 mg) to 6 pills per day (3,000 mg). The dosing interval has also changed from 2 pills every 4-6 hours to 2 pills every 6 hours.    If you feel your pain relief is insufficient, you may take Tylenol/Acetaminophen in addition to your narcotic pain medication.     Be careful not to exceed 3,000 mg of Tylenol/Acetaminophen in a 24 hour period from all sources.    If you are taking extra strength Tylenol/acetaminophen (500 mg), the maximum dose is 6 tablets in 24 hours.    If you are taking regular strength acetaminophen (325 mg), the maximum dose is 9 tablets in 24 hours.    Call a doctor for any of the followin. Signs of infection (fever, growing tenderness at the surgery site, a large amount of drainage or bleeding, severe pain, foul-smelling drainage, redness, swelling).  2. It has been over 8 to 10 hours since surgery and you are still not able to urinate (pass water).  3. Headache for over 24 hours.  4. Numbness, tingling or weakness the day after surgery (if you had spinal anesthesia).  Your doctor is:  Dr. Sage Rojas, Colon Rectal: [phone]                    Or dial [phone] and ask for the resident on call for:  Colon Rectal  For emergency care, call the:  [facility]:   "[phone] (TTY for hearing impaired: [phone])                      Pending Results     No orders found from 2017 to 2017.            Admission Information     Date & Time Provider Department Dept. Phone    2017 Sage Rojas MD [facility] [phone]      Your Vitals Were     Blood Pressure Pulse Temperature Respirations Height Weight    120/76 66 98.9  F (37.2  C) (Oral) 16 1.791 m (5' 10.51\") 84.2 kg (185 lb 9.6 oz)    Pulse Oximetry BMI (Body Mass Index)                99% 26.25 kg/m2          MyChart Information     [employer] is an electronic gateway that provides easy, online access to your medical records. With [employer], you can request a clinic appointment, read your test results, renew a prescription or communicate with your care team.     To sign up for [employer] visit the website at [email or web address]   You will be asked to enter the access code listed below, as well as some personal information. Please follow the directions to create your username and password.     Your access code is: [email or web address]  Expires: 2017  6:30 AM     Your access code will  in 90 days. If you need help or a new code, please contact your [facility] or call [phone] for assistance.        Care EveryWhere ID     This is your Care EveryWhere ID. This could be used by other organizations to access your [facility] medical records  [id number]        Equal Access to Services     STEPHANIE CLINE : Hadii jaxon juarez hadasho Sojoseloali, waaxda luqadaha, qaybta kaalmada adeegyada, itz idiin hayaan adeabilio bundy . So [facility] [phone].    ATENCIÓN: Si habla español, tiene a adam disposición servicios gratuitos de asistencia lingüística. Llame al [phone].    We comply with applicable federal civil rights laws and [place] laws. We do not discriminate on the basis of race, color, national origin, age, disability sex, sexual " orientation or gender identity.               Review of your medicines      START taking        Dose / Directions    acetaminophen 325 MG tablet   Commonly known as:  TYLENOL   Used for:  Hemorrhoids, unspecified hemorrhoid type        Dose:  1000 mg   Take 3 tablets (975 mg) by mouth 4 times daily for 10 days Alternate with ibuprofen (ADVIL/MOTRIN), IF ordered.   Quantity:  120 tablet   Refills:  0       ibuprofen 800 MG tablet   Commonly known as:  ADVIL/MOTRIN   Used for:  Hemorrhoids, unspecified hemorrhoid type        Dose:  800 mg   Take 1 tablet (800 mg) by mouth 3 times daily for 10 days Alternate with acetaminophen (TYLENOL), IF ordered.   Quantity:  30 tablet   Refills:  0       metroNIDAZOLE 250 MG tablet   Commonly known as:  FLAGYL   Used for:  Hemorrhoids, unspecified hemorrhoid type        Dose:  250 mg   Take 1 tablet (250 mg) by mouth 3 times daily for 5 days   Quantity:  15 tablet   Refills:  0       oxyCODONE 5 MG IR tablet   Commonly known as:  ROXICODONE   Used for:  Hemorrhoids, unspecified hemorrhoid type        Dose:  5-10 mg   Take 1-2 tablets (5-10 mg) by mouth every 4 hours as needed for severe pain   Quantity:  40 tablet   Refills:  0       polyethylene glycol powder   Commonly known as:  MIRALAX/GLYCOLAX   Used for:  Hemorrhoids, unspecified hemorrhoid type        Dose:  1 capful   Take 17 g (1 capful) by mouth daily as needed for other (If no bowel movement in 24 hours. Mix in 8 oz of water; May take twice daily.)   Quantity:  500 g   Refills:  0       silver sulfADIAZINE 1 % cream   Commonly known as:  SILVADENE   Used for:  Hemorrhoids, unspecified hemorrhoid type        Apply topically 3 times daily for 7 days   Quantity:  50 g   Refills:  0       traMADol 50 MG tablet   Commonly known as:  ULTRAM   Used for:  Hemorrhoids, unspecified hemorrhoid type        Dose:   mg   Take 1-2 tablets ( mg) by mouth every 3 hours as needed for pain   Quantity:  20 tablet   Refills:  0             Where to get your medicines      These medications were sent to [place], [place] - [address]  [address], [facility] [address]    Hours:  TRANSPLANT PHONE NUMBER [phone] Phone:  [phone]     acetaminophen 325 MG tablet    ibuprofen 800 MG tablet    metroNIDAZOLE 250 MG tablet    polyethylene glycol powder    silver sulfADIAZINE 1 % cream         Some of these will need a paper prescription and others can be bought over the counter. Ask your nurse if you have questions.     Bring a paper prescription for each of these medications     oxyCODONE 5 MG IR tablet    traMADol 50 MG tablet                Protect others around you: Learn how to safely use, store and throw away your medicines at www.disposemymeds.org.             Medication List: This is a list of all your medications and when to take them. Check marks below indicate your daily home schedule. Keep this list as a reference.      Medications           Morning Afternoon Evening Bedtime As Needed    acetaminophen 325 MG tablet   Commonly known as:  TYLENOL   Take 3 tablets (975 mg) by mouth 4 times daily for 10 days Alternate with ibuprofen (ADVIL/MOTRIN), IF ordered.   Last time this was given:  975 mg on 8/30/2017  7:25 AM                                ibuprofen 800 MG tablet   Commonly known as:  ADVIL/MOTRIN   Take 1 tablet (800 mg) by mouth 3 times daily for 10 days Alternate with acetaminophen (TYLENOL), IF ordered.                                metroNIDAZOLE 250 MG tablet   Commonly known as:  FLAGYL   Take 1 tablet (250 mg) by mouth 3 times daily for 5 days                                oxyCODONE 5 MG IR tablet   Commonly known as:  ROXICODONE   Take 1-2 tablets (5-10 mg) by mouth every 4 hours as needed for severe pain                                polyethylene glycol powder   Commonly known as:  MIRALAX/GLYCOLAX   Take 17 g (1 capful) by mouth daily as needed  for other (If no bowel movement in 24 hours. Mix in 8 oz of water; May take twice daily.)                                silver sulfADIAZINE 1 % cream   Commonly known as:  SILVADENE   Apply topically 3 times daily for 7 days   Last time this was given:  2 applicators on 8/30/2017  9:43 AM                                traMADol 50 MG tablet   Commonly known as:  ULTRAM   Take 1-2 tablets ( mg) by mouth every 3 hours as needed for pain

## 2017-08-30 NOTE — IP AVS SNAPSHOT
[facility]    [address] [place] [address]    Phone:  [phone]    Fax:  [phone]                                       After Visit Summary   8/30/2017    Carlee Trejo    MRN: [id number]           After Visit Summary Signature Page     I have received my discharge instructions, and my questions have been answered. I have discussed any challenges I see with this plan with the nurse or doctor.    ..........................................................................................................................................  Patient/Patient Representative Signature      ..........................................................................................................................................  Patient Representative Print Name and Relationship to Patient    ..................................................               ................................................  Date                                            Time    ..........................................................................................................................................  Reviewed by Signature/Title    ...................................................              ..............................................  Date                                                            Time

## 2017-09-01 LAB — COPATH REPORT: NORMAL

## 2017-09-27 ENCOUNTER — OFFICE VISIT (OUTPATIENT)
Dept: SURGERY | Facility: CLINIC | Age: 27
End: 2017-09-27

## 2017-09-27 VITALS
WEIGHT: 181.9 LBS | TEMPERATURE: 98.4 F | HEIGHT: 71 IN | SYSTOLIC BLOOD PRESSURE: 143 MMHG | BODY MASS INDEX: 25.47 KG/M2 | OXYGEN SATURATION: 97 % | DIASTOLIC BLOOD PRESSURE: 82 MMHG | HEART RATE: 76 BPM

## 2017-09-27 DIAGNOSIS — K64.8 INTERNAL HEMORRHOIDS: ICD-10-CM

## 2017-09-27 DIAGNOSIS — Z09 FOLLOW-UP EXAMINATION FOLLOWING SURGERY: Primary | ICD-10-CM

## 2017-09-27 ASSESSMENT — PAIN SCALES - GENERAL: PAINLEVEL: NO PAIN (0)

## 2017-09-27 NOTE — PROGRESS NOTES
Colon and Rectal Surgery Postoperative Clinic Note    RE: Carlee Trejo  : 1990  ARON: 2017    Carlee Trejo is a very pleasant 26 year old male wit hematochezia and severe mixed hemorrhoidal disease who is now status post LigaSure hemorrhoidectomy X 3 and flexible sigmoidoscopy on 2017 with Dr. Rojas. He presents today for follow up.      Interval history: VJ reports that he is very pleased with how he did after surgery. He had some mild pain after a few days but this was much less than he expected. He no longer has any pain or discomfort. He denies any bleeding. He has been taking Miralax daily and is having soft stools.     Assessment/Plan:  26 year old male status post LigaSure hemorrhoidectomy X 3 and flexible sigmoidoscopy on 2017 with Dr. Rojas. He is recovering well from surgery. Minimal pain after surgery and this has resolved. He is having soft bowel movements and I discussed the importance of keeping his bowel movements soft and avoiding straining long term. Surgical sites externally are healing well. Reassured him the sutures will dissolve. He can return to clinic in 3-4 weeks if not completely healed. He can otherwise follow up as needed.  Patient's questions were answered to his stated satisfaction and he is in agreement with this plan.    Medical history:  Past Medical History:   Diagnosis Date     Anal ulcer      Hemorrhoids        Surgical history:  Past Surgical History:   Procedure Laterality Date     EXAM UNDER ANESTHESIA ANUS N/A 2017    Procedure: EXAM UNDER ANESTHESIA ANUS;  Examination Under Anesthesia of Anus, Hemorroidectomy Internal, Flexible Sigmoidoscopy;  Surgeon: Sage Rojas MD;  Location: [facility] OR     HEMORRHOIDECTOMY INTERNAL N/A 2017    Procedure: HEMORRHOIDECTOMY INTERNAL;;  Surgeon: Sage Rojas MD;  Location: [facility] OR     SIGMOIDOSCOPY FLEXIBLE N/A 2017    Procedure: SIGMOIDOSCOPY FLEXIBLE;;  Surgeon:  "Sage Rojas MD;  Location: [facility] OR       Problem list:  Patient Active Problem List    Diagnosis Date Noted     Lichen striatus 04/04/2013     Priority: Medium     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    Nursing Notes:   Aniya Weathers LPN  9/27/2017  5:31 PM  Signed  Chief Complaint   Patient presents with     Surgical Followup     post op       Vitals:    09/27/17 1729   BP: 143/82   Pulse: 76   Temp: 98.4  F (36.9  C)   TempSrc: Oral   SpO2: 97%   Weight: 181 lb 14.4 oz   Height: 5' 11\"       Body mass index is 25.37 kg/(m^2).      Aniya EVANS LPN                          Physical Examination:  /82  Pulse 76  Temp 98.4  F (36.9  C) (Oral)  Ht 5' 11\"  Wt 181 lb 14.4 oz  SpO2 97%  BMI 25.37 kg/m2  General: alert, oriented, in no acute distress, sitting comfortably  HEENT: mucous membranes moist  Perianal external examination:  Surgical wounds with sutures in place. No erythema, drainage, or bleeding.  Digital rectal examination: Was deferred.    Anoscopy: Was deferred.    Total face to face time was 10 minutes, >50% counseling.  This is a postop visit.    SPENCER Duran, NP-C  Colon and Rectal Surgery  [facility]    This note was created using speech recognition software and may contain unintended word substitutions.      "

## 2017-09-27 NOTE — Clinical Note
2017       RE: Carlee Trejo  [address]  [facility] [address]     Dear Colleague,    Thank you for referring your patient, Carlee Trejo, to the [facility] COLON AND RECTAL SURGERY at [facility]. Please see a copy of my visit note below.    Colon and Rectal Surgery Postoperative Clinic Note    RE: Carlee Trejo  : 1990  ARON: 2017    Carlee Trejo is a very pleasant 26 year old male wit hematochezia and severe mixed hemorrhoidal disease who is now status post LigaSure hemorrhoidectomy X 3 and flexible sigmoidoscopy on 2017 with Dr. Rojas. He presents today for follow up.      Interval history: VJ reports that he is very pleased with how he did after surgery. He had some mild pain after a few days but this was much less than he expected. He no longer has any pain or discomfort. He denies any bleeding. He has been taking Miralax daily and is having soft stools.     Assessment/Plan:  26 year old male status post LigaSure hemorrhoidectomy X 3 and flexible sigmoidoscopy on 2017 with Dr. Rojas. He is recovering well from surgery. Minimal pain after surgery and this has resolved. He is having soft bowel movements and I discussed the importance of keeping his bowel movements soft and avoiding straining long term. Surgical sites externally are healing well. Reassured him the sutures will dissolve. He can return to clinic in 3-4 weeks if not completely healed. He can otherwise follow up as needed.  Patient's questions were answered to his stated satisfaction and he is in agreement with this plan.    Medical history:  Past Medical History:   Diagnosis Date     Anal ulcer      Hemorrhoids        Surgical history:  Past Surgical History:   Procedure Laterality Date     EXAM UNDER ANESTHESIA ANUS N/A 2017    Procedure: EXAM UNDER ANESTHESIA ANUS;  Examination Under Anesthesia of Anus, Hemorroidectomy Internal,  "Flexible Sigmoidoscopy;  Surgeon: Sage Rojas MD;  [facility]     HEMORRHOIDECTOMY INTERNAL N/A 8/30/2017    Procedure: HEMORRHOIDECTOMY INTERNAL;;  Surgeon: Sage Rojas MD;  [facility]     SIGMOIDOSCOPY FLEXIBLE N/A 8/30/2017    Procedure: SIGMOIDOSCOPY FLEXIBLE;;  Surgeon: Sage Rojas MD;  [facility]       Problem list:  Patient Active Problem List    Diagnosis Date Noted     Lichen striatus 04/04/2013     Priority: Medium     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    Nursing Notes:   Aniya Weathers LPN  9/27/2017  5:31 PM  Signed  Chief Complaint   Patient presents with     Surgical Followup     post op       Vitals:    09/27/17 1729   BP: 143/82   Pulse: 76   Temp: 98.4  F (36.9  C)   TempSrc: Oral   SpO2: 97%   Weight: 181 lb 14.4 oz   Height: 5' 11\"       Body mass index is 25.37 kg/(m^2).      Aniya EVANS LPN                          Physical Examination:  /82  Pulse 76  Temp 98.4  F (36.9  C) (Oral)  Ht 5' 11\"  Wt 181 lb 14.4 oz  SpO2 97%  BMI 25.37 kg/m2  General: alert, oriented, in no acute distress, sitting comfortably  HEENT: mucous membranes moist  Perianal external examination:  Surgical wounds with sutures in place. No erythema, drainage, or bleeding.  Digital rectal examination: Was deferred.    Anoscopy: Was deferred.    Total face to face time was 10 minutes, >50% counseling.  This is a postop visit.    Ashley Campo" SPENCER NP-C  Colon and Rectal Surgery  [facility]    This note was created using speech recognition software and may contain unintended word substitutions.        Again, thank you for allowing me to participate in the care of your patient.      Sincerely,    SPENCER Dolan CNP

## 2017-09-27 NOTE — MR AVS SNAPSHOT
After Visit Summary   9/27/2017    Carlee Trejo    MRN: [id number]           Patient Information     Date Of Birth          1990        Visit Information        Provider Department      9/27/2017 5:45 PM Ashley Ley APRN [facility] Colon and Rectal Surgery        Today's Diagnoses     Follow-up examination following surgery    -  1    Internal hemorrhoids           Follow-ups after your visit        Who to contact     Please call your clinic at [phone] to:    Ask questions about your health    Make or cancel appointments    Discuss your medicines    Learn about your test results    Speak to your doctor   If you have compliments or concerns about an experience at your clinic, or if you wish to file a complaint, please contact [facility] Patient Relations at [phone] or email us at [email or web address]         Additional Information About Your Visit        MyChart Information     [employer] gives you secure access to your electronic health record. If you see a primary care provider, you can also send messages to your care team and make appointments. If you have questions, please call your primary care clinic.  If you do not have a primary care provider, please call [phone] and they will assist you.      [employer] is an electronic gateway that provides easy, online access to your medical records. With [employer], you can request a clinic appointment, read your test results, renew a prescription or communicate with your care team.     To access your existing account, please contact your [facility] or call [phone] for assistance.        Care EveryWhere ID     This is your Care EveryWhere ID. This could be used by other organizations to access your [place] medical records  [id number]        Your Vitals Were     Pulse Temperature Height Pulse Oximetry BMI (Body Mass Index)       76 98.4  F  "(36.9  C) (Oral) 5' 11\" 97% 25.37 kg/m2        Blood Pressure from Last 3 Encounters:   09/27/17 143/82   08/30/17 103/62   08/28/17 125/72    Weight from Last 3 Encounters:   09/27/17 181 lb 14.4 oz   08/30/17 185 lb 9.6 oz   08/28/17 185 lb 9.6 oz              Today, you had the following     No orders found for display       Primary Care Provider    None Specified       No primary provider on file.        Equal Access to Services     STEPHANIE CLINE : Hadii jaxon ku hadkerao Soomaali, waaxda luqadaha, qaybta kaalmada mason, itz bundy . So [facility] [phone].    ATENCIÓN: Si habla español, tiene a adam disposición servicios gratuitos de asistencia lingüística. Llame al [phone].    We comply with applicable federal civil rights laws and [place] laws. We do not discriminate on the basis of race, color, national origin, age, disability sex, sexual orientation or gender identity.            Thank you!     Thank you for choosing [facility] COLON AND RECTAL SURGERY  for your care. Our goal is always to provide you with excellent care. Hearing back from our patients is one way we can continue to improve our services. Please take a few minutes to complete the written survey that you may receive in the mail after your visit with us. Thank you!             Your Updated Medication List - Protect others around you: Learn how to safely use, store and throw away your medicines at www.disposemymeds.org.          This list is accurate as of: 9/27/17  6:05 PM.  Always use your most recent med list.                   Brand Name Dispense Instructions for use Diagnosis    UNKNOWN TO PATIENT      Laxative          "

## 2017-09-27 NOTE — LETTER
2017      RE: Carlee Trejo  [address]  [place] [address]       Colon and Rectal Surgery Postoperative Clinic Note    RE: Carlee Trejo  : 1990  ARON: 2017    Carlee Trejo is a very pleasant 26 year old male wit hematochezia and severe mixed hemorrhoidal disease who is now status post LigaSure hemorrhoidectomy X 3 and flexible sigmoidoscopy on 2017 with Dr. Rojas. He presents today for follow up.      Interval history: VJ reports that he is very pleased with how he did after surgery. He had some mild pain after a few days but this was much less than he expected. He no longer has any pain or discomfort. He denies any bleeding. He has been taking Miralax daily and is having soft stools.     Assessment/Plan:  26 year old male status post LigaSure hemorrhoidectomy X 3 and flexible sigmoidoscopy on 2017 with Dr. Rojas. He is recovering well from surgery. Minimal pain after surgery and this has resolved. He is having soft bowel movements and I discussed the importance of keeping his bowel movements soft and avoiding straining long term. Surgical sites externally are healing well. Reassured him the sutures will dissolve. He can return to clinic in 3-4 weeks if not completely healed. He can otherwise follow up as needed.  Patient's questions were answered to his stated satisfaction and he is in agreement with this plan.    Medical history:  Past Medical History:   Diagnosis Date     Anal ulcer      Hemorrhoids        Surgical history:  Past Surgical History:   Procedure Laterality Date     EXAM UNDER ANESTHESIA ANUS N/A 2017    Procedure: EXAM UNDER ANESTHESIA ANUS;  Examination Under Anesthesia of Anus, Hemorroidectomy Internal, Flexible Sigmoidoscopy;  Surgeon: Sage Rojas MD;  [facility]     HEMORRHOIDECTOMY INTERNAL N/A 2017    Procedure: HEMORRHOIDECTOMY INTERNAL;;  Surgeon: Sage Rojas MD;   "[facility]     SIGMOIDOSCOPY FLEXIBLE N/A 8/30/2017    Procedure: SIGMOIDOSCOPY FLEXIBLE;;  Surgeon: Sage Rojas MD;  [facility]       Problem list:  Patient Active Problem List    Diagnosis Date Noted     Lichen striatus 04/04/2013     Priority: Medium     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    Nursing Notes:   Aniya Weathers LPN  9/27/2017  5:31 PM  Signed  Chief Complaint   Patient presents with     Surgical Followup     post op       Vitals:    09/27/17 1729   BP: 143/82   Pulse: 76   Temp: 98.4  F (36.9  C)   TempSrc: Oral   SpO2: 97%   Weight: 181 lb 14.4 oz   Height: 5' 11\"       Body mass index is 25.37 kg/(m^2).      Aniya EVANS LPN                          Physical Examination:  /82  Pulse 76  Temp 98.4  F (36.9  C) (Oral)  Ht 5' 11\"  Wt 181 lb 14.4 oz  SpO2 97%  BMI 25.37 kg/m2  General: alert, oriented, in no acute distress, sitting comfortably  HEENT: mucous membranes moist  Perianal external examination:  Surgical wounds with sutures in place. No erythema, drainage, or bleeding.  Digital rectal examination: Was deferred.    Anoscopy: Was deferred.    Total face to face time was 10 minutes, >50% counseling.  This is a postop visit.    SPENCER Duran, NP-C  Colon and Rectal Surgery  [facility]    This note was created using speech recognition software and may contain unintended word substitutions.        Ashley Mukherjee" SPENCER Dior CNP

## 2017-09-27 NOTE — NURSING NOTE
"Chief Complaint   Patient presents with     Surgical Followup     post op       Vitals:    09/27/17 1729   BP: 143/82   Pulse: 76   Temp: 98.4  F (36.9  C)   TempSrc: Oral   SpO2: 97%   Weight: 181 lb 14.4 oz   Height: 5' 11\"       Body mass index is 25.37 kg/(m^2).      Aniya EVANS LPN                       "

## 2018-07-30 ENCOUNTER — OFFICE VISIT (OUTPATIENT)
Dept: DERMATOLOGY | Facility: CLINIC | Age: 28
End: 2018-07-30
Payer: COMMERCIAL

## 2018-07-30 DIAGNOSIS — L44.2 LICHEN STRIATUS: Primary | ICD-10-CM

## 2018-07-30 DIAGNOSIS — L28.0 LICHEN SIMPLEX CHRONICUS: ICD-10-CM

## 2018-07-30 RX ORDER — TRIAMCINOLONE ACETONIDE 1 MG/G
OINTMENT TOPICAL 2 TIMES DAILY
Qty: 80 G | Refills: 3 | Status: SHIPPED | OUTPATIENT
Start: 2018-07-30

## 2018-07-30 ASSESSMENT — PAIN SCALES - GENERAL: PAINLEVEL: NO PAIN (0)

## 2018-07-30 NOTE — MR AVS SNAPSHOT
After Visit Summary   7/30/2018    Carlee Trejo    MRN: [id number]           Patient Information     Date Of Birth          1990        Visit Information        Provider Department      7/30/2018 2:30 PM Brittany Grant PA-C [facility]        Today's Diagnoses     Lichen striatus    -  1       Follow-ups after your visit        Your next 10 appointments already scheduled     Oct 24, 2018  8:30 AM CDT   (Arrive by 8:15 AM)   Return Visit with FRANCISCO Feng [facility] ([facility])    [address] [place] [id number] [phone]              Who to contact     Please call your clinic at [phone] to:    Ask questions about your health    Make or cancel appointments    Discuss your medicines    Learn about your test results    Speak to your doctor            Additional Information About Your Visit        MyChart Information     [employer] gives you secure access to your electronic health record. If you see a primary care provider, you can also send messages to your care team and make appointments. If you have questions, please call your primary care clinic.  If you do not have a primary care provider, please call [phone] and they will assist you.      [employer] is an electronic gateway that provides easy, online access to your medical records. With [employer], you can request a clinic appointment, read your test results, renew a prescription or communicate with your care team.     To access your existing account, please contact your [facility] or call [phone] for assistance.        Care EveryWhere ID     This is your Care EveryWhere ID. This could be used by other organizations to access your [facility] medical records  [id number]         Blood Pressure from Last 3 Encounters:   09/27/17 143/82   08/30/17 103/62   08/28/17 125/72    Weight from Last 3  Encounters:   09/27/17 82.5 kg (181 lb 14.4 oz)   08/30/17 84.2 kg (185 lb 9.6 oz)   08/28/17 84.2 kg (185 lb 9.6 oz)              Today, you had the following     No orders found for display         Today's Medication Changes          These changes are accurate as of 7/30/18  3:02 PM.  If you have any questions, ask your nurse or doctor.               Start taking these medicines.        Dose/Directions    triamcinolone 0.1 % ointment   Commonly known as:  KENALOG   Used for:  Lichen striatus        Apply topically 2 times daily For two weeks on the neck and one week on the groin. Take a week break before restarting.   Quantity:  80 g   Refills:  3            Where to get your medicines      These medications were sent to [facility]/pharmacy #[id number] - [place], [place] - [address]  [address] [place] [address]     Phone:  [phone]     triamcinolone 0.1 % ointment                Primary Care Provider    None Specified       No primary provider on file.        Equal Access to Services     STEPHANIE CLINE : Hadchilango coleo Sosandy, waaxda luqadaha, qaybta kaalmada adeatif, itz otto. So [facility] [phone].    ATENCIÓN: Si habla español, tiene a adam disposición servicios gratuitos de asistencia lingüística. Llame al [phone].    We comply with applicable federal civil rights laws and [place] laws. We do not discriminate on the basis of race, color, national origin, age, disability, sex, sexual orientation, or gender identity.            Thank you!     Thank you for choosing [facility] DERMATOLOGY  for your care. Our goal is always to provide you with excellent care. Hearing back from our patients is one way we can continue to improve our services. Please take a few minutes to complete the written survey that you may receive in the mail after your visit with us. Thank you!             Your Updated Medication List - Protect others around you: Learn how to safely  use, store and throw away your medicines at www.disposemymeds.org.          This list is accurate as of 7/30/18  3:02 PM.  Always use your most recent med list.                   Brand Name Dispense Instructions for use Diagnosis    triamcinolone 0.1 % ointment    KENALOG    80 g    Apply topically 2 times daily For two weeks on the neck and one week on the groin. Take a week break before restarting.    Lichen striatus       UNKNOWN TO PATIENT      Laxative

## 2018-07-30 NOTE — NURSING NOTE
Dermatology Rooming Note    Carlee Trejo's goals for this visit include:   Chief Complaint   Patient presents with     Derm Problem     VJ comes to clinic today stating he has a large itchy patch on his neck.      Maira Modi LPN

## 2018-07-30 NOTE — PROGRESS NOTES
[facility] Dermatology Note      Dermatology Problem List:  1. Lichen striatus, neck. Diagnosed 4/2013  - Current tx: triamcinolone 0.01% ointment BID    Encounter Date: Jul 30, 2018    CC:  Chief Complaint   Patient presents with     Derm Problem     VMARLINE comes to clinic today stating he has a large itchy patch on his neck.          History of Present Illness:  Mr. Carlee Trejo is a 27 year old male who presents for evaluation of a lesion of concern on his neck. This is the same patch of skin that was biopsied in the past, diagnosing the lesion as lichen striatus. At that time, the patch was rather small, but it has been growing for some time. He was prescribed triamcinolone cream to treat the area, which he has not used in a very long time. There is a similar patch of skin in his groin area as well. This area has been present for a long time as well but has not grown like the patch on the neck. Admits this area can be very pruritic. He applies Vaseline to this area regularly.  Other than this, the patient has no areas of concern which he would like addressed at this visit.      Past Medical History:   Patient Active Problem List   Diagnosis     Postinflammatory pigmentary changes     Lichen striatus     Past Medical History:   Diagnosis Date     Anal ulcer      Hemorrhoids      Past Surgical History:   Procedure Laterality Date     EXAM UNDER ANESTHESIA ANUS N/A 8/30/2017    Procedure: EXAM UNDER ANESTHESIA ANUS;  Examination Under Anesthesia of Anus, Hemorroidectomy Internal, Flexible Sigmoidoscopy;  Surgeon: Sage Rojas MD;  [facility]     HEMORRHOIDECTOMY INTERNAL N/A 8/30/2017    Procedure: HEMORRHOIDECTOMY INTERNAL;;  Surgeon: Sage Rojas MD;  [facility]     SIGMOIDOSCOPY FLEXIBLE N/A 8/30/2017    Procedure: SIGMOIDOSCOPY FLEXIBLE;;  Surgeon: Sage Rojas MD;  Location:  OR       Social History:  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 Hopes to work for [employer].    Family History:  -No skin cancer in the family.     Medications:  Current Outpatient Prescriptions   Medication Sig Dispense Refill     UNKNOWN TO PATIENT Laxative         No Known Allergies    Review of Systems:    -Constitutional: The patient is generally well.   -Skin: As above in HPI. No additional skin concerns.    Physical exam:  Vitals: There were no vitals taken for this visit.  GEN: This is a well developed, well-nourished male in no acute distress, in a pleasant mood.    SKIN: Focused examination of the face, neck and left groin  - On the right lower neck, there is a 10 x 5 cm brownish violaceous scaly annular plaque on the right lateral neck  - On the left upper medial thigh extending to the inguinal crease is a thin slightly lichenified skin colored plaque with mild scale. Lichen simplex chronicus vs lichen striatus  -No other lesions of concern on areas examined.       Impression/Plan:  1. Lichen striatus, right neck    Start triamcinolone 0.01% ointment. Apply to the affected area on the neck for up to two weeks, with a one week break after. Restart as needed.    If there is no significant improvement, consider another bx at f/u    2. Scaly plaque, left inguinal crease. KOH negative. Lichen simplex chronicus vs lichen striatus    Restart triamcinolone 0.01% ointment. Apply to the area around the groin for up to one week.       Follow-up in 3 months, earlier for new or changing lesions.       Staff Involved:  Scribe/Staff    Scribe Disclosure:  FRED, Sigifredo Watson, am serving as a scribe to document services personally performed by Brittany Grant PA-C, based on data collection and the provider's statements to me.     Provider Disclosure:   The documentation recorded by the scribe accurately reflects the services I personally performed and the decisions made by me.    All risks, benefits and alternatives were  discussed with patient.  Patient is in agreement and understands the assessment and plan.  All questions were answered.  Sun Screen Education was given.   Return to Clinic in 3 months or sooner as needed.   Brittany Grant PA-C   [facility]

## 2018-07-30 NOTE — LETTER
7/30/2018       RE: Carlee Trejo  [address]  [address]  [facility] [address]     Dear Colleague,    Thank you for referring your patient, Carlee Trejo, to the [facility] DERMATOLOGY at [facility]. Please see a copy of my visit note below.    [facility] Dermatology Note      Dermatology Problem List:  1. Lichen striatus, neck. Diagnosed 4/2013  - Current tx: triamcinolone 0.01% ointment BID    Encounter Date: Jul 30, 2018    CC:  Chief Complaint   Patient presents with     Derm Problem     VJ comes to clinic today stating he has a large itchy patch on his neck.          History of Present Illness:  Mr. Carlee Trejo is a 27 year old male who presents for evaluation of a lesion of concern on his neck. This is the same patch of skin that was biopsied in the past, diagnosing the lesion as lichen striatus. At that time, the patch was rather small, but it has been growing for some time. He was prescribed triamcinolone cream to treat the area, which he has not used in a very long time. There is a similar patch of skin in his groin area as well. This area has been present for a long time as well but has not grown like the patch on the neck. Admits this area can be very pruritic. He applies Vaseline to this area regularly.  Other than this, the patient has no areas of concern which he would like addressed at this visit.      Past Medical History:   Patient Active Problem List   Diagnosis     Postinflammatory pigmentary changes     Lichen striatus     Past Medical History:   Diagnosis Date     Anal ulcer      Hemorrhoids      Past Surgical History:   Procedure Laterality Date     EXAM UNDER ANESTHESIA ANUS N/A 8/30/2017    Procedure: EXAM UNDER ANESTHESIA ANUS;  Examination Under Anesthesia of Anus, Hemorroidectomy Internal, Flexible Sigmoidoscopy;  Surgeon: Sage Rojas MD;  Location: [facility] OR     HEMORRHOIDECTOMY INTERNAL N/A  8/30/2017    Procedure: HEMORRHOIDECTOMY INTERNAL;;  Surgeon: Sage Rojas MD;  Location: [facility] OR     SIGMOIDOSCOPY FLEXIBLE N/A 8/30/2017    Procedure: SIGMOIDOSCOPY FLEXIBLE;;  Surgeon: Sage Rojas MD;  Location:  OR       Social History:  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 Hopes to work for [employer].    Family History:  -No skin cancer in the family.     Medications:  Current Outpatient Prescriptions   Medication Sig Dispense Refill     UNKNOWN TO PATIENT Laxative         No Known Allergies    Review of Systems:    -Constitutional: The patient is generally well.   -Skin: As above in HPI. No additional skin concerns.    Physical exam:  Vitals: There were no vitals taken for this visit.  GEN: This is a well developed, well-nourished male in no acute distress, in a pleasant mood.    SKIN: Focused examination of the face, neck and left groin  - On the right lower neck, there is a 10 x 5 cm brownish violaceous scaly annular plaque on the right lateral neck  - On the left upper medial thigh extending to the inguinal crease is a thin slightly lichenified skin colored plaque with mild scale. Lichen simplex chronicus vs lichen striatus  -No other lesions of concern on areas examined.       Impression/Plan:  1. Lichen striatus, right neck    Start triamcinolone 0.01% ointment. Apply to the affected area on the neck for up to two weeks, with a one week break after. Restart as needed.    If there is no significant improvement, consider another bx at f/u    2. Scaly plaque, left inguinal crease. KOH negative. Lichen simplex chronicus vs lichen striatus    Restart triamcinolone 0.01% ointment. Apply to the area around the groin for up to one week.       Follow-up in 3 months, earlier for new or changing lesions.     Staff Involved:  Scribe/Staff    Scribe Disclosure:  Sigifredo IBARRA, am serving as a scribe to document services personally  performed by Brittany Grant PA-C, based on data collection and the provider's statements to me.     Provider Disclosure:   The documentation recorded by the scribe accurately reflects the services I personally performed and the decisions made by me.    All risks, benefits and alternatives were discussed with patient.  Patient is in agreement and understands the assessment and plan.  All questions were answered.  Sun Screen Education was given.   Return to Clinic in 3 months or sooner as needed.     Again, thank you for allowing me to participate in the care of your patient.      Sincerely,    Brittany Grant PA-C

## 2018-12-21 ENCOUNTER — WALK IN (OUTPATIENT)
Dept: URGENT CARE | Age: 28
End: 2018-12-21

## 2018-12-21 DIAGNOSIS — M25.441 SWELLING OF JOINT, HAND, RIGHT: Primary | ICD-10-CM

## 2018-12-21 PROCEDURE — 99202 OFFICE O/P NEW SF 15 MIN: CPT | Performed by: FAMILY MEDICINE

## 2018-12-21 RX ORDER — TRIAMCINOLONE ACETONIDE 1 MG/G
CREAM TOPICAL 2 TIMES DAILY
Qty: 15 G | Refills: 0 | Status: SHIPPED | OUTPATIENT
Start: 2018-12-21

## 2018-12-21 RX ORDER — SULFAMETHOXAZOLE AND TRIMETHOPRIM 800; 160 MG/1; MG/1
1 TABLET ORAL 2 TIMES DAILY
Qty: 14 TABLET | Refills: 0 | Status: SHIPPED | OUTPATIENT
Start: 2018-12-21 | End: 2018-12-28

## 2018-12-21 SDOH — HEALTH STABILITY: MENTAL HEALTH: HOW OFTEN DO YOU HAVE A DRINK CONTAINING ALCOHOL?: NEVER

## 2018-12-21 ASSESSMENT — PAIN SCALES - GENERAL: PAINLEVEL: 0

## 2020-03-02 ENCOUNTER — HEALTH MAINTENANCE LETTER (OUTPATIENT)
Age: 30
End: 2020-03-02

## 2020-06-05 NOTE — MR AVS SNAPSHOT
After Visit Summary   6/22/2017    Carlee Trejo    MRN: [id number]           Patient Information     Date Of Birth          1990        Visit Information        Provider Department      6/22/2017 9:45 AM Ashley Ley APRN CNP [facility] Colon and Rectal Surgery        Today's Diagnoses     Anal fissure    -  1      Care Instructions    1. Diltiazem ointment 2% to be applied 3 times daily for 8 weeks  2. Fiber supplement such as Metamcuil, Citrucel, or Benefiber once to twice a day  3. MiraLax or Milk of Magnesia if still constipated  4. Drink 10 glasses of water a day  5. Tylenol, ibuprofen, and warm tub baths/sitz baths for pain  6. Follow up in 8 weeks. Follow up sooner if symptoms do not improve after 4 weeks. If symptoms persist, consider examination under anesthesia            Follow-ups after your visit        Your next 10 appointments already scheduled     Aug 17, 2017  8:30 AM CDT   (Arrive by 8:15 AM)   Return Visit with SPENCER Novak CNP [facility] Colon and Rectal Surgery ([facility])    [address] [place] [id number] [phone]              Who to contact     Please call your clinic at [phone] to:    Ask questions about your health    Make or cancel appointments    Discuss your medicines    Learn about your test results    Speak to your doctor   If you have compliments or concerns about an experience at your clinic, or if you wish to file a complaint, please contact [facility] Patient Relations at [phone] or email us at [email or web address]         Additional Information About Your Visit        MyChart Information     HowAboutWe is an electronic gateway that provides easy, online access to your medical records. With HowAboutWe, you can request a clinic appointment, read your test results, renew a prescription or communicate with your care  "team.     To sign up for [employer] visit the website at [email or web address]   You will be asked to enter the access code listed below, as well as some personal information. Please follow the directions to create your username and password.     Your access code is: [email or web address]  Expires: 2017  6:30 AM     Your access code will  in 90 days. If you need help or a new code, please contact your [facility] or call [phone] for assistance.        Care EveryWhere ID     This is your Care EveryWhere ID. This could be used by other organizations to access your [place] medical records  [id number]        Your Vitals Were     Pulse Height Pulse Oximetry BMI (Body Mass Index)          65 5' 10.47\" 100% 25.95 kg/m2         Blood Pressure from Last 3 Encounters:   17 118/73    Weight from Last 3 Encounters:   17 183 lb 4.8 oz              Today, you had the following     No orders found for display         Today's Medication Changes          These changes are accurate as of: 17 10:24 AM.  If you have any questions, ask your nurse or doctor.               Start taking these medicines.        Dose/Directions    diltiazem 2% in PLO cream (FV COMPOUNDED) 2% Gel   Used for:  Anal fissure   Started by:  Ashley Ley APRN CNP        To anal opening three times daily.  Use a pea-sized amount.  Store at room temperature.   Quantity:  60 g   Refills:  0         Stop taking these medicines if you haven't already. Please contact your care team if you have questions.     triamcinolone 0.1 % cream   Commonly known as:  KENALOG   Stopped by:  Ashley Ley APRN CNP                Where to get your medicines      These medications were sent to [facility] [place], [place] - [address] [facility] [address] [place] [address], [facility] [address]    Hours:  TRANSPLANT PHONE NUMBER [phone] Phone:  " [phone]     diltiazem 2% in PLO cream (FV COMPOUNDED) 2% Gel                Primary Care Provider    No Ref-Primary Verified       No address on file        Equal Access to Services     CANDYGARRY SON : Matthias juarez taylor Beth, janetda luqadaha, argenista kajose angelda mason, itz santoyo laFritzruth otto. So [facility] [phone].    ATENCIÓN: Si habla español, tiene a adam disposición servicios gratuitos de asistencia lingüística. Llame al [phone].    We comply with applicable federal civil rights laws and [place] laws. We do not discriminate on the basis of race, color, national origin, age, disability sex, sexual orientation or gender identity.            Thank you!     Thank you for choosing [facility] COLON AND RECTAL SURGERY  for your care. Our goal is always to provide you with excellent care. Hearing back from our patients is one way we can continue to improve our services. Please take a few minutes to complete the written survey that you may receive in the mail after your visit with us. Thank you!             Your Updated Medication List - Protect others around you: Learn how to safely use, store and throw away your medicines at www.disposemymeds.org.          This list is accurate as of: 6/22/17 10:24 AM.  Always use your most recent med list.                   Brand Name Dispense Instructions for use Diagnosis    diltiazem 2% in PLO cream (FV COMPOUNDED) 2% Gel     60 g    To anal opening three times daily.  Use a pea-sized amount.  Store at room temperature.    Anal fissure       NO ACTIVE MEDICATIONS              Elevated total protein in setting of MM.   - trend LFTs to monitor if disease progression    #Hx of cardiac amyloidosis  Underwent cardiac cath with biopsy in September 2019 to rule out amyloidosis. SPECT imaging consistent with amyloidosis, however, biopsy negative.  - follow up outpatient

## 2020-09-16 ENCOUNTER — APPOINTMENT (OUTPATIENT)
Dept: CT IMAGING | Age: 30
End: 2020-09-16
Attending: EMERGENCY MEDICINE

## 2020-09-16 ENCOUNTER — HOSPITAL ENCOUNTER (EMERGENCY)
Age: 30
Discharge: HOME | End: 2020-09-16
Attending: EMERGENCY MEDICINE

## 2020-09-16 VITALS
HEART RATE: 72 BPM | OXYGEN SATURATION: 100 % | SYSTOLIC BLOOD PRESSURE: 124 MMHG | BODY MASS INDEX: 28.44 KG/M2 | WEIGHT: 210 LBS | DIASTOLIC BLOOD PRESSURE: 78 MMHG | HEIGHT: 72 IN | TEMPERATURE: 98.4 F | RESPIRATION RATE: 14 BRPM

## 2020-09-16 DIAGNOSIS — M54.6 ACUTE BILATERAL THORACIC BACK PAIN: Primary | ICD-10-CM

## 2020-09-16 LAB
ANION GAP BLD CALC-SCNC: 17 MMOL/L (ref 10–20)
ATRIAL RATE (BPM): 67
BASOPHILS # BLD: 0 K/MCL (ref 0–0.3)
BASOPHILS NFR BLD: 0 %
BUN BLD-MCNC: 17 MG/DL (ref 6–20)
CA-I BLD-SCNC: 1.27 MMOL/L (ref 1.15–1.29)
CHLORIDE BLD-SCNC: 99 MMOL/L (ref 98–107)
CO2 BLD-SCNC: 28 MMOL/L (ref 19–24)
CREAT SERPL-MCNC: 1.1 MG/DL (ref 0.67–1.17)
DEPRECATED RDW RBC: 38.5 FL (ref 39–50)
DIASTOLIC BLOOD PRESSURE: 86
EOSINOPHIL # BLD: 0.3 K/MCL (ref 0.1–0.5)
EOSINOPHIL NFR BLD: 4 %
ERYTHROCYTE [DISTWIDTH] IN BLOOD: 12.2 % (ref 11–15)
GFR SERPLBLD BASED ON 1.73 SQ M-ARVRAT: 90 ML/MIN/1.73M2
GLUCOSE BLD-MCNC: 105 MG/DL (ref 70–99)
HCT VFR BLD CALC: 45.4 % (ref 39–51)
HCT VFR BLD CALC: 49 % (ref 39–51)
HGB BLD CALC-MCNC: 16.7 G/DL (ref 13–17)
HGB BLD-MCNC: 15.2 G/DL (ref 13–17)
IMM GRANULOCYTES # BLD AUTO: 0 K/MCL (ref 0–0.2)
IMM GRANULOCYTES # BLD: 0 %
LYMPHOCYTES # BLD: 3 K/MCL (ref 1–4.8)
LYMPHOCYTES NFR BLD: 38 %
MCH RBC QN AUTO: 29.1 PG (ref 26–34)
MCHC RBC AUTO-ENTMCNC: 33.5 G/DL (ref 32–36.5)
MCV RBC AUTO: 87 FL (ref 78–100)
MONOCYTES # BLD: 0.6 K/MCL (ref 0.3–0.9)
MONOCYTES NFR BLD: 7 %
NEUTROPHILS # BLD: 4.1 K/MCL (ref 1.8–7.7)
NEUTROPHILS NFR BLD: 51 %
NRBC BLD MANUAL-RTO: 0 /100 WBC
P AXIS (DEGREES): 39
PLATELET # BLD AUTO: 251 K/MCL (ref 140–450)
POTASSIUM BLD-SCNC: 3.8 MMOL/L (ref 3.4–5.1)
PR-INTERVAL (MSEC): 164
QRS-INTERVAL (MSEC): 92
QT-INTERVAL (MSEC): 376
QTC: 397
R AXIS (DEGREES): 40
RAINBOW EXTRA TUBES HOLD SPECIMEN: NORMAL
RBC # BLD: 5.22 MIL/MCL (ref 4.5–5.9)
REPORT TEXT: NORMAL
SODIUM BLD-SCNC: 140 MMOL/L (ref 135–145)
SYSTOLIC BLOOD PRESSURE: 126
T AXIS (DEGREES): 28
TROPONIN I BLD-MCNC: <0.1 NG/ML
VENTRICULAR RATE EKG/MIN (BPM): 67
WBC # BLD: 8.1 K/MCL (ref 4.2–11)

## 2020-09-16 PROCEDURE — 10002800 HB RX 250 W HCPCS: Performed by: EMERGENCY MEDICINE

## 2020-09-16 PROCEDURE — 74176 CT ABD & PELVIS W/O CONTRAST: CPT | Performed by: RADIOLOGY

## 2020-09-16 PROCEDURE — 84484 ASSAY OF TROPONIN QUANT: CPT

## 2020-09-16 PROCEDURE — 85025 COMPLETE CBC W/AUTO DIFF WBC: CPT | Performed by: EMERGENCY MEDICINE

## 2020-09-16 PROCEDURE — 80047 BASIC METABLC PNL IONIZED CA: CPT

## 2020-09-16 PROCEDURE — 93005 ELECTROCARDIOGRAM TRACING: CPT | Performed by: EMERGENCY MEDICINE

## 2020-09-16 PROCEDURE — 99284 EMERGENCY DEPT VISIT MOD MDM: CPT

## 2020-09-16 PROCEDURE — 96374 THER/PROPH/DIAG INJ IV PUSH: CPT

## 2020-09-16 PROCEDURE — 10002803 HB RX 637: Performed by: EMERGENCY MEDICINE

## 2020-09-16 PROCEDURE — 74176 CT ABD & PELVIS W/O CONTRAST: CPT

## 2020-09-16 RX ORDER — TIZANIDINE 2 MG/1
2 TABLET ORAL ONCE
Status: COMPLETED | OUTPATIENT
Start: 2020-09-16 | End: 2020-09-16

## 2020-09-16 RX ORDER — LIDOCAINE 4 G/G
1 PATCH TOPICAL ONCE
Status: DISCONTINUED | OUTPATIENT
Start: 2020-09-16 | End: 2020-09-16 | Stop reason: HOSPADM

## 2020-09-16 RX ORDER — TIZANIDINE 2 MG/1
2 TABLET ORAL EVERY 8 HOURS PRN
Qty: 15 TABLET | Refills: 0 | Status: SHIPPED | OUTPATIENT
Start: 2020-09-16

## 2020-09-16 RX ORDER — IBUPROFEN 600 MG/1
600 TABLET ORAL EVERY 8 HOURS PRN
Qty: 20 TABLET | Refills: 0 | Status: SHIPPED | OUTPATIENT
Start: 2020-09-16

## 2020-09-16 RX ORDER — LIDOCAINE 50 MG/G
1 PATCH TOPICAL EVERY 24 HOURS
Qty: 15 PATCH | Refills: 0 | Status: SHIPPED | OUTPATIENT
Start: 2020-09-16

## 2020-09-16 RX ADMIN — TIZANIDINE 2 MG: 2 TABLET ORAL at 04:13

## 2020-09-16 RX ADMIN — LIDOCAINE 1 PATCH: 560 PATCH PERCUTANEOUS; TOPICAL; TRANSDERMAL at 04:13

## 2020-09-16 RX ADMIN — KETOROLAC TROMETHAMINE 15 MG: 15 INJECTION, SOLUTION INTRAMUSCULAR; INTRAVENOUS at 04:13

## 2020-09-16 ASSESSMENT — ENCOUNTER SYMPTOMS
HEADACHES: 0
HEMATOLOGIC/LYMPHATIC NEGATIVE: 1
PSYCHIATRIC NEGATIVE: 1
CONSTIPATION: 0
TROUBLE SWALLOWING: 0
SHORTNESS OF BREATH: 0
ALLERGIC/IMMUNOLOGIC NEGATIVE: 1
COLOR CHANGE: 0
DIARRHEA: 0
ABDOMINAL PAIN: 0
ACTIVITY CHANGE: 0
WEAKNESS: 0
CHEST TIGHTNESS: 0
ENDOCRINE NEGATIVE: 1
NAUSEA: 0
FEVER: 0
DIZZINESS: 0
LIGHT-HEADEDNESS: 0
CHILLS: 0
EYE DISCHARGE: 0
BACK PAIN: 1
VOMITING: 0
COUGH: 0

## 2020-12-20 ENCOUNTER — HEALTH MAINTENANCE LETTER (OUTPATIENT)
Age: 30
End: 2020-12-20

## 2021-03-06 ENCOUNTER — TELEPHONE (OUTPATIENT)
Dept: SCHEDULING | Age: 31
End: 2021-03-06

## 2021-04-18 ENCOUNTER — HEALTH MAINTENANCE LETTER (OUTPATIENT)
Age: 31
End: 2021-04-18

## 2021-05-26 VITALS
SYSTOLIC BLOOD PRESSURE: 118 MMHG | DIASTOLIC BLOOD PRESSURE: 80 MMHG | BODY MASS INDEX: 26.6 KG/M2 | HEART RATE: 96 BPM | OXYGEN SATURATION: 97 % | HEIGHT: 71 IN | RESPIRATION RATE: 14 BRPM | TEMPERATURE: 98.8 F | WEIGHT: 190 LBS

## 2021-10-03 ENCOUNTER — HEALTH MAINTENANCE LETTER (OUTPATIENT)
Age: 31
End: 2021-10-03

## 2022-05-14 ENCOUNTER — HEALTH MAINTENANCE LETTER (OUTPATIENT)
Age: 32
End: 2022-05-14

## 2022-09-04 ENCOUNTER — HEALTH MAINTENANCE LETTER (OUTPATIENT)
Age: 32
End: 2022-09-04

## 2023-06-03 ENCOUNTER — HEALTH MAINTENANCE LETTER (OUTPATIENT)
Age: 33
End: 2023-06-03

## (undated) DEVICE — SUCTION MANIFOLD NEPTUNE 2 SYS 4 PORT 0702-020-000

## (undated) DEVICE — TAPE CLOTH ADHESIVE 3" LATEX 3554C

## (undated) DEVICE — PAD CHUX UNDERPAD 30X30"

## (undated) DEVICE — KIT ENDO FIRST STEP DISINFECTANT 200ML W/POUCH EP-4

## (undated) DEVICE — ESU LIGASURE OPEN SEALER/DIVIDER SM JAW 16.5MM LF1212A

## (undated) DEVICE — ENDO SNARE POLYPECTOMY OVAL 10MM LOOP SD-240U-10

## (undated) DEVICE — TAPE MEDIPORE 4"X10YD 2964

## (undated) DEVICE — TUBING SUCTION 12"X1/4" N612

## (undated) DEVICE — GLOVE PROTEXIS BLUE W/NEU-THERA 8.0  2D73EB80

## (undated) DEVICE — STRAP UNIVERSAL POSITIONING 2-PIECE 4X47X76" 91-287

## (undated) DEVICE — SOL WATER IRRIG 1000ML BOTTLE 2F7114

## (undated) DEVICE — PREP POVIDONE IODINE SOLUTION 10% 120ML

## (undated) DEVICE — ENDO TUBING CO2 SMARTCAP STERILE DISP 100145CO2EXT

## (undated) DEVICE — ENDO CONNECTOR ENDOGATOR AUX WATER JET FOR OLYMPUS SCOPE

## (undated) DEVICE — JELLY LUBRICATING SURGILUBE 2OZ TUBE

## (undated) DEVICE — TAPE DURAPORE 3" SILK 1538-3

## (undated) DEVICE — PACK RECTAL KIT CUSTOM ASC

## (undated) DEVICE — LINEN TOWEL PACK X6 WHITE 5487

## (undated) DEVICE — SOL BENZOIN 0.5OZ

## (undated) DEVICE — GLOVE PROTEXIS MICRO 7.5  2D73PM75

## (undated) DEVICE — PANTIES MESH LG/XLG 2PK 706M2

## (undated) DEVICE — SOL WATER IRRIG 500ML BOTTLE 2F7113

## (undated) DEVICE — LINEN TOWEL PACK X5 5464

## (undated) DEVICE — SU VICRYL 3-0 SH 27" J316H

## (undated) DEVICE — RX BACITRACIN OINTMENT 0.9G 1/32OZ CUR001109

## (undated) DEVICE — ESU GROUND PAD ADULT W/CORD E7507

## (undated) DEVICE — GOWN ISOLATION YELLOW XL NOT STERILE 3111PG

## (undated) DEVICE — SPECIMEN CONTAINER W/10% BUFFERED FORMALIN 120ML 591201

## (undated) RX ORDER — ONDANSETRON 2 MG/ML
INJECTION INTRAMUSCULAR; INTRAVENOUS
Status: DISPENSED
Start: 2017-08-30

## (undated) RX ORDER — FENTANYL CITRATE 50 UG/ML
INJECTION, SOLUTION INTRAMUSCULAR; INTRAVENOUS
Status: DISPENSED
Start: 2017-08-30

## (undated) RX ORDER — GLYCOPYRROLATE 0.2 MG/ML
INJECTION INTRAMUSCULAR; INTRAVENOUS
Status: DISPENSED
Start: 2017-08-30

## (undated) RX ORDER — CELECOXIB 200 MG/1
CAPSULE ORAL
Status: DISPENSED
Start: 2017-08-30

## (undated) RX ORDER — PROPOFOL 10 MG/ML
INJECTION, EMULSION INTRAVENOUS
Status: DISPENSED
Start: 2017-08-30

## (undated) RX ORDER — CIPROFLOXACIN 2 MG/ML
INJECTION, SOLUTION INTRAVENOUS
Status: DISPENSED
Start: 2017-08-30

## (undated) RX ORDER — OXYCODONE HYDROCHLORIDE 5 MG/1
TABLET ORAL
Status: DISPENSED
Start: 2017-08-30

## (undated) RX ORDER — DEXAMETHASONE SODIUM PHOSPHATE 4 MG/ML
INJECTION, SOLUTION INTRA-ARTICULAR; INTRALESIONAL; INTRAMUSCULAR; INTRAVENOUS; SOFT TISSUE
Status: DISPENSED
Start: 2017-08-30

## (undated) RX ORDER — ACETAMINOPHEN 325 MG/1
TABLET ORAL
Status: DISPENSED
Start: 2017-08-30

## (undated) RX ORDER — KETAMINE HYDROCHLORIDE 10 MG/ML
INJECTION, SOLUTION INTRAMUSCULAR; INTRAVENOUS
Status: DISPENSED
Start: 2017-08-30